# Patient Record
Sex: FEMALE | Race: BLACK OR AFRICAN AMERICAN | Employment: UNEMPLOYED | URBAN - METROPOLITAN AREA
[De-identification: names, ages, dates, MRNs, and addresses within clinical notes are randomized per-mention and may not be internally consistent; named-entity substitution may affect disease eponyms.]

---

## 2018-06-21 ENCOUNTER — CLINICAL DOCUMENTATION (OUTPATIENT)
Dept: ENT CLINIC | Age: 73
End: 2018-06-21

## 2018-06-25 ENCOUNTER — HOSPITAL ENCOUNTER (OUTPATIENT)
Dept: RADIATION ONCOLOGY | Age: 73
Discharge: HOME | End: 2018-06-25
Payer: COMMERCIAL

## 2018-06-25 VITALS
OXYGEN SATURATION: 93 % | HEIGHT: 66 IN | DIASTOLIC BLOOD PRESSURE: 64 MMHG | TEMPERATURE: 98.2 F | HEART RATE: 83 BPM | WEIGHT: 161.3 LBS | SYSTOLIC BLOOD PRESSURE: 110 MMHG | BODY MASS INDEX: 25.92 KG/M2

## 2018-06-25 DIAGNOSIS — C80.1 CANCER (HCC): Primary | ICD-10-CM

## 2018-06-25 PROCEDURE — 99205 OFFICE O/P NEW HI 60 MIN: CPT

## 2018-06-25 PROCEDURE — 99205 OFFICE O/P NEW HI 60 MIN: CPT | Performed by: RADIOLOGY

## 2018-06-29 ENCOUNTER — HOSPITAL ENCOUNTER (OUTPATIENT)
Dept: RADIATION ONCOLOGY | Age: 73
Discharge: HOME | End: 2018-06-29
Payer: COMMERCIAL

## 2018-06-29 ENCOUNTER — TELEPHONE (OUTPATIENT)
Dept: RADIATION ONCOLOGY | Age: 73
End: 2018-06-29

## 2018-06-29 PROCEDURE — 77334 RADIATION TREATMENT AID(S): CPT

## 2018-06-29 RX ORDER — MELOXICAM 7.5 MG/1
7.5 TABLET ORAL 2 TIMES DAILY
COMMUNITY
End: 2019-04-11

## 2018-06-29 RX ORDER — DEXAMETHASONE 4 MG/1
4 TABLET ORAL 2 TIMES DAILY WITH MEALS
COMMUNITY
End: 2019-04-11

## 2018-06-29 RX ORDER — MIRTAZAPINE 30 MG/1
30 TABLET, FILM COATED ORAL NIGHTLY
Status: ON HOLD | COMMUNITY
End: 2019-04-17 | Stop reason: HOSPADM

## 2018-06-29 RX ORDER — PROCHLORPERAZINE MALEATE 10 MG
10 TABLET ORAL EVERY 6 HOURS PRN
COMMUNITY
End: 2019-04-11

## 2018-06-29 RX ORDER — ONDANSETRON HYDROCHLORIDE 8 MG/1
8 TABLET, FILM COATED ORAL EVERY 8 HOURS PRN
COMMUNITY
End: 2019-04-11

## 2018-07-02 ENCOUNTER — HOSPITAL ENCOUNTER (OUTPATIENT)
Dept: RADIATION ONCOLOGY | Age: 73
Discharge: HOME | End: 2018-07-02
Payer: COMMERCIAL

## 2018-07-02 DIAGNOSIS — T66.XXXA RADIATION-INDUCED ESOPHAGITIS: Primary | ICD-10-CM

## 2018-07-02 DIAGNOSIS — R13.10 ODYNOPHAGIA: ICD-10-CM

## 2018-07-02 DIAGNOSIS — K20.80 RADIATION-INDUCED ESOPHAGITIS: Primary | ICD-10-CM

## 2018-07-03 ENCOUNTER — TELEPHONE (OUTPATIENT)
Dept: RADIATION ONCOLOGY | Age: 73
End: 2018-07-03

## 2018-07-03 NOTE — TELEPHONE ENCOUNTER
Completed and emailed transportation request to [employer] for pt's radiation therapy appts 7/6 through 8/15. Confirmation Numbers are below:     Dates         Confirmation #  Dates      Confirmation #   Dates    Confirmation #  Dates       Confirmation #    [id number] 22.66.6033 [id number] 07.31.2018 [id number] 79.31.2606 [id number]   43.43.9953 [id number] 81.65.5078 [id number] 40.77.2317 [id number] 38.91.6153 [id number]   07.10.2018 [id number] 07.20.2018 [id number] 60.67.8747 [id number]     07.11.2018 [id number] 07.23.2018 [id number] 64.17.6081 [id number]     07.12.2018 [id number] 59.54.2706 [id number] 51.96.6381 [id number]     07.13.2018 [id number] 07.26.2018 [id number] 14.53.9692 [id number]     07.16.2018 [id number] 07.27.2018 [id number] 08.10.2018 [id number]       Dates                Confirmation #  [id number]            [id number]  71.05.1768       [id number]  07.30.2018        [id number]  25.88.1650         [id number]  94.67.4183         [id number]  08.15.2018        [id number]    Called pt to update her. There was no answer. Stated that pt would have transportation with [employer] starting on 7/6 through the duration of treatment. Left a message encouraging to return call.

## 2018-07-05 PROCEDURE — 77301 RADIOTHERAPY DOSE PLAN IMRT: CPT

## 2018-07-05 PROCEDURE — 77386 HC NTSTY MODUL RAD TX DLVR CPLX: CPT

## 2018-07-05 PROCEDURE — 77338 DESIGN MLC DEVICE FOR IMRT: CPT

## 2018-07-05 PROCEDURE — 77300 RADIATION THERAPY DOSE PLAN: CPT

## 2018-07-06 ENCOUNTER — TELEPHONE (OUTPATIENT)
Dept: RADIATION ONCOLOGY | Age: 73
End: 2018-07-06

## 2018-07-06 PROCEDURE — 77386 HC NTSTY MODUL RAD TX DLVR CPLX: CPT

## 2018-07-09 PROCEDURE — 77386 HC NTSTY MODUL RAD TX DLVR CPLX: CPT

## 2018-07-10 PROCEDURE — 77386 HC NTSTY MODUL RAD TX DLVR CPLX: CPT

## 2018-07-11 ENCOUNTER — HOSPITAL ENCOUNTER (OUTPATIENT)
Dept: RADIATION ONCOLOGY | Age: 73
Discharge: HOME | End: 2018-07-11
Payer: COMMERCIAL

## 2018-07-11 VITALS
WEIGHT: 171.6 LBS | HEART RATE: 90 BPM | DIASTOLIC BLOOD PRESSURE: 76 MMHG | OXYGEN SATURATION: 94 % | SYSTOLIC BLOOD PRESSURE: 120 MMHG | BODY MASS INDEX: 28.12 KG/M2 | TEMPERATURE: 97.8 F

## 2018-07-11 DIAGNOSIS — C34.92 ADENOCARCINOMA, LUNG, LEFT (HCC): Primary | ICD-10-CM

## 2018-07-11 PROCEDURE — 99999 PR OFFICE/OUTPT VISIT,PROCEDURE ONLY: CPT | Performed by: RADIOLOGY

## 2018-07-11 PROCEDURE — 77336 RADIATION PHYSICS CONSULT: CPT

## 2018-07-11 PROCEDURE — 77386 HC NTSTY MODUL RAD TX DLVR CPLX: CPT

## 2018-07-11 NOTE — PROGRESS NOTES
DEPARTMENT OF RADIATION ONCOLOGY   ON TREATMENT VISIT       7/11/2018      NAME:  Shoshana Jason    YOB: 1945      Diagnosis: NSCLC left upper lung. SUBJECTIVE:   Shoshana Jason status post  1000 cGy to the lung mass with positive lymph nodes. She also gets weekly chemotherapy. She denies any symptoms of dysphagia, cough or chest pain. Physical Examination: Mild skin reaction anterior chest.  She looks comfortable      Wt Readings from Last 3 Encounters:   07/11/18 171 lb 9.6 oz (77.8 kg)   06/25/18 161 lb 4.8 oz (73.2 kg)   11/01/17 174 lb (78.9 kg)       Labs:No results found for: WBC, RBC, HGB, HCT, MCV, MCH, MCHC, RDW, PLT, MPV         ASSESSMENT/PLAN:     Continue treatment as planned      Cassidy Askew M.D.   Radiation Oncologist  [facility]: [phone]   Andrews Winston: [phone] Knapp: [phone]   Andrews Winston: [phone])

## 2018-07-12 PROCEDURE — 77386 HC NTSTY MODUL RAD TX DLVR CPLX: CPT

## 2018-07-13 PROCEDURE — 77386 HC NTSTY MODUL RAD TX DLVR CPLX: CPT

## 2018-07-16 ENCOUNTER — TELEPHONE (OUTPATIENT)
Dept: RADIATION ONCOLOGY | Age: 73
End: 2018-07-16

## 2018-07-16 ENCOUNTER — TELEPHONE (OUTPATIENT)
Dept: INFUSION THERAPY | Age: 73
End: 2018-07-16

## 2018-07-16 PROCEDURE — 77386 HC NTSTY MODUL RAD TX DLVR CPLX: CPT

## 2018-07-16 NOTE — TELEPHONE ENCOUNTER
Contacted patient per referral, re: weight loss. Patient recently started radiation for her Lung CA. She reports she did lose weight, but she has \"gained it all back and more\". Her appetite is \"great\" right now, and she is happy about this. She hopes it continues to be this way. She is also taking Boost Plus usually twice daily. She was encouraged to reach out to this clinician during her time in radiation if her appetite changes or she experiences weight loss. She was receptive.  Nav Villatoro RD,,LD

## 2018-07-17 PROCEDURE — 77386 HC NTSTY MODUL RAD TX DLVR CPLX: CPT

## 2018-07-18 ENCOUNTER — HOSPITAL ENCOUNTER (OUTPATIENT)
Dept: RADIATION ONCOLOGY | Age: 73
Discharge: HOME | End: 2018-07-18
Payer: COMMERCIAL

## 2018-07-18 VITALS
TEMPERATURE: 98 F | DIASTOLIC BLOOD PRESSURE: 64 MMHG | WEIGHT: 170.5 LBS | OXYGEN SATURATION: 98 % | BODY MASS INDEX: 27.94 KG/M2 | HEART RATE: 92 BPM | SYSTOLIC BLOOD PRESSURE: 106 MMHG

## 2018-07-18 DIAGNOSIS — C80.1 CANCER (HCC): Primary | ICD-10-CM

## 2018-07-18 PROCEDURE — 77336 RADIATION PHYSICS CONSULT: CPT

## 2018-07-18 PROCEDURE — 77386 HC NTSTY MODUL RAD TX DLVR CPLX: CPT

## 2018-07-18 PROCEDURE — 99999 PR OFFICE/OUTPT VISIT,PROCEDURE ONLY: CPT | Performed by: RADIOLOGY

## 2018-07-18 NOTE — PROGRESS NOTES
DEPARTMENT OF RADIATION ONCOLOGY ON TREATMENT VISIT         7/18/2018      NAME:  Leonidas Garcia    YOB: 1945      Diagnosis: lung cancer      SUBJECTIVE:   Leonidas Garcia has now received 2000 cGy in 10/30 fractions directed to the lung. Past medical, surgical, social and family histories reviewed and updated as indicated. Pain: controlled      ALLERGIES:  Patient has no known allergies. Current Outpatient Prescriptions   Medication Sig Dispense Refill    mirtazapine (REMERON) 30 MG tablet Take 30 mg by mouth nightly      dexamethasone (DECADRON) 4 MG tablet Take 4 mg by mouth 2 times daily (with meals) 5pills by mouth12 hours before and 6 hours prior to chemo infusion      meloxicam (MOBIC) 7.5 MG tablet Take 7.5 mg by mouth 2 times daily      prochlorperazine (COMPAZINE) 10 MG tablet Take 10 mg by mouth every 6 hours as needed      ondansetron (ZOFRAN) 8 MG tablet Take 8 mg by mouth every 8 hours as needed for Nausea or Vomiting      Probiotic Product (PROBIOTIC DAILY PO) Take by mouth as needed      NONFORMULARY Patient states use a breathing treatment prn for chronic bronchitis / rarely uses  Instructed to use dos if needs      triamterene-hydrochlorothiazide (DYAZIDE) 50-25 MG per capsule Take 1 capsule by mouth every morning Patient takes 37.5mg-25mg      HYDROcodone-acetaminophen (NORCO) 5-325 MG per tablet Take 1 tablet by mouth every 6 hours as needed for Pain  Instructed to take morning of surgery with a sip of water if needs. No current facility-administered medications for this encounter. OBJECTIVE:  Alert and fully ambulatory. Pleasant and conversant. Physical Examination: General appearance - alert, well appearing, and in no distress.           Wt Readings from Last 3 Encounters:   07/18/18 170 lb 8 oz (77.3 kg)   07/11/18 171 lb 9.6 oz (77.8 kg)   06/25/18 161 lb 4.8 oz (73.2 kg)         ASSESSMENT/PLAN:     Patient is tolerating treatments well with expected toxicities. RBA were reviewed prior to first fraction and PRN. Current and planned dose reviewed. Goals of treatment and potential side effects were reviewed with the patient PRN. Treatment imaging has been personally reviewed for accuracy and precision. Questions answered to apparent satisfaction. Treatments will continue as planned. Kyleigh Topete.  Angela Chavez MD MS DABR  Radiation Oncologist        [facility] ([address] [place] [address]): [phone] /// FAX: [phone]  [facility]): [phone] /// FAX: [phone]  [address] [place] [address]): [phone] /// FAX: [phone]

## 2018-07-18 NOTE — PROGRESS NOTES
Adalid Moran  7/18/2018  Wt Readings from Last 3 Encounters:   07/18/18 170 lb 8 oz (77.3 kg)   07/11/18 171 lb 9.6 oz (77.8 kg)   06/25/18 161 lb 4.8 oz (73.2 kg)     Body mass index is 27.94 kg/m². Treatment Area:lung    Patient was seen today for weekly visit. Comfort Alteration  KPS:80%  Fatigue: Moderate    Ventilation Alterations  Cough: Yes  Hemoptysis: No  Mucus Color: white watery   Dyspnea: No  O2 Sat: 98%    Nutritional Alteration  Anorexia: No  Nausea: No   Vomiting: No     Skin Alteration   Sensation:na    Radiation Dermatitis:  na    Mucous Membrane Alteration  Voice Changes/ Stridor/Larynx: no  Pharynx & Esophagus: na    Elimination Alterations  Constipation: no  Diarrhea:  no      Emotional  Coping: effective      Injury, potential bleeding or infection: na    Other:Patient experiencing some acid reflux issues at this time-was constipated but this issue has since resolved with daily stool softener usage. No results found for: WBC, PLT      /64   Pulse 92   Temp 98 °F (36.7 °C) (Oral)   Wt 170 lb 8 oz (77.3 kg)   SpO2 98%   BMI 27.94 kg/m²   BP within normal range? yes          Assessment/Plan: Patient has received 10/30 fractions, 2000/6000cGy-Currently states she is tired-\"wore out\" from receiving both chemo and radiation therapy yesterday. Patient has no other issues or concerns at this time.      Bhavana Tracey

## 2018-07-18 NOTE — PATIENT INSTRUCTIONS
Continue daily fractionated radiation therapy as scheduled. Please see weekly OTV note and intial consultation letter in [facility] for clinical details. Clarisse Matteo.  Billy Pereira MD [address] [place] [address]  Radiation Oncology  [place]:  [phone]   FAX:    [phone]: [address] [place] [address] Road:  [phone]

## 2018-07-19 PROCEDURE — 77386 HC NTSTY MODUL RAD TX DLVR CPLX: CPT

## 2018-07-20 PROCEDURE — 77386 HC NTSTY MODUL RAD TX DLVR CPLX: CPT

## 2018-07-23 ENCOUNTER — TELEPHONE (OUTPATIENT)
Dept: ONCOLOGY | Age: 73
End: 2018-07-23

## 2018-07-23 PROCEDURE — 77386 HC NTSTY MODUL RAD TX DLVR CPLX: CPT

## 2018-07-23 RX ORDER — SUCRALFATE 1 G/1
1 TABLET ORAL 4 TIMES DAILY
Qty: 120 TABLET | Refills: 2 | Status: SHIPPED | OUTPATIENT
Start: 2018-07-23 | End: 2018-10-30 | Stop reason: SDUPTHER

## 2018-07-23 NOTE — TELEPHONE ENCOUNTER
Met with patient following her daily radiation therapy treatment for follow up. Patient has had 13 treatments. Upon inquiring, states that she is doing ok with the treatments. Reports sore throat/burning issues. Sasha Cole NP after treatment today and was given scripts for carafate and mmw. States that transportation is going good. Provided support and encouragement. No other needs at this time. Patient appreciative of visit. Will continue to follow.

## 2018-07-23 NOTE — PROGRESS NOTES
DEPARTMENT OF RADIATION ONCOLOGY   ON TREATMENT VISIT       7/23/2018      NAME:  Charles Current    YOB: 1945    Diagnosis: Locally advanced (III-C) LEMUEL adenocarcinoma. -PDL 1= 40%    SUBJECTIVE:   Charles Current has now received 2,600 cGy in 13/30 fractions directed to the LEMUEL and mediastinum. Seen today due to complaints of odynphagia along with complaints of esophageal reflux. She reports she is eating small amounts of soft foods and is drinking nutritional supplemental shakes (Ensure). Patient follows with Dr. Eliot Holder for medical oncology. Past medical, surgical, social and family histories reviewed and updated as indicated. Pain: painful swallow. ALLERGIES:  Patient has no known allergies. Current Outpatient Prescriptions   Medication Sig Dispense Refill    Magic Mouthwash (MIRACLE MOUTHWASH) Swish and swallow 10 mLs 4 times daily as needed for Irritation Shake well before using.  240 mL 2    sucralfate (CARAFATE) 1 GM tablet Take 1 tablet by mouth 4 times daily 120 tablet 2    mirtazapine (REMERON) 30 MG tablet Take 30 mg by mouth nightly      dexamethasone (DECADRON) 4 MG tablet Take 4 mg by mouth 2 times daily (with meals) 5pills by mouth12 hours before and 6 hours prior to chemo infusion      meloxicam (MOBIC) 7.5 MG tablet Take 7.5 mg by mouth 2 times daily      prochlorperazine (COMPAZINE) 10 MG tablet Take 10 mg by mouth every 6 hours as needed      ondansetron (ZOFRAN) 8 MG tablet Take 8 mg by mouth every 8 hours as needed for Nausea or Vomiting      Probiotic Product (PROBIOTIC DAILY PO) Take by mouth as needed      NONFORMULARY Patient states use a breathing treatment prn for chronic bronchitis / rarely uses  Instructed to use dos if needs      triamterene-hydrochlorothiazide (DYAZIDE) 50-25 MG per capsule Take 1 capsule by mouth every morning Patient takes 37.5mg-25mg      HYDROcodone-acetaminophen (NORCO) 5-325 MG per tablet Take 1 tablet by mouth every 6 hours as needed for Pain  Instructed to take morning of surgery with a sip of water if needs. No current facility-administered medications for this encounter. OBJECTIVE:     Wt Readings from Last 3 Encounters:   07/18/18 170 lb 8 oz (77.3 kg)   07/11/18 171 lb 9.6 oz (77.8 kg)   06/25/18 161 lb 4.8 oz (73.2 kg)       Alert and fully ambulatory. Pleasant and conversant. Irradiated skin intact no changes. ASSESSMENT/PLAN:     Patient is tolerating treatments with expected toxicities. Radiation induced esophagitis: Start Carafate. Odynophagia: Start MM. Will update Registered Dietitian Jeri Kline to follow-up with patient provide further dietary recommendations. Current and planned dose reviewed. Goals of treatment and potential side effects were reviewed with the patient. Questions answered to apparent satisfaction. Treatments will continue as planned.       Judie Ryan, MSN, RN, APRN-CNP  Certified Nurse Practitioner for [facility]        P: [phone]/ F: [phone]

## 2018-07-24 PROCEDURE — 77386 HC NTSTY MODUL RAD TX DLVR CPLX: CPT

## 2018-07-25 ENCOUNTER — HOSPITAL ENCOUNTER (OUTPATIENT)
Dept: RADIATION ONCOLOGY | Age: 73
Discharge: HOME | End: 2018-07-25
Payer: COMMERCIAL

## 2018-07-25 VITALS
DIASTOLIC BLOOD PRESSURE: 76 MMHG | OXYGEN SATURATION: 97 % | SYSTOLIC BLOOD PRESSURE: 122 MMHG | TEMPERATURE: 98 F | HEART RATE: 95 BPM

## 2018-07-25 DIAGNOSIS — C80.1 CANCER (HCC): Primary | ICD-10-CM

## 2018-07-25 PROCEDURE — 77386 HC NTSTY MODUL RAD TX DLVR CPLX: CPT

## 2018-07-25 PROCEDURE — 77336 RADIATION PHYSICS CONSULT: CPT

## 2018-07-25 PROCEDURE — 99999 PR OFFICE/OUTPT VISIT,PROCEDURE ONLY: CPT | Performed by: RADIOLOGY

## 2018-07-25 NOTE — PROGRESS NOTES
well appearing, and in no distress. Wt Readings from Last 3 Encounters:   07/18/18 170 lb 8 oz (77.3 kg)   07/11/18 171 lb 9.6 oz (77.8 kg)   06/25/18 161 lb 4.8 oz (73.2 kg)         ASSESSMENT/PLAN:     Patient is tolerating treatments well with expected toxicities. RBA were reviewed prior to first fraction and PRN. Current and planned dose reviewed. Goals of treatment and potential side effects were reviewed with the patient PRN. Treatment imaging has been personally reviewed for accuracy and precision. Questions answered to apparent satisfaction. Treatments will continue as planned.      -carafate (cont)        Flores Coon.  Lenny De La Rosa MD MS DABR  Radiation Oncologist        [facility] ([address] [facility]): [phone] /// FAX: [phone]  [facility]): [phone] /// FAX: [phone]  [address] [place] [address]): [phone] /// FAX: [phone]

## 2018-07-26 ENCOUNTER — TELEPHONE (OUTPATIENT)
Dept: RADIATION ONCOLOGY | Age: 73
End: 2018-07-26

## 2018-07-26 PROCEDURE — 77386 HC NTSTY MODUL RAD TX DLVR CPLX: CPT

## 2018-07-26 NOTE — PROGRESS NOTES
Pt tolerated exercise. See scanned report.   foods, tomato sauce and citrus; Add other soft foods and modify foods to soften for tolerance. Will monitor for need for increase in Boost Plus. [employer] GUIDELINES FOR CLINICAL CHARACTERISTICS OF MALNUTRITION IN CHRONIC ILLNESS     Moderate Malnutrition  Severe Malnutrition    Energy intake  <75% energy intake compared to estimated needs for >1month <75% energy intake compared to estimated needs for >1month   Weight changes  5% x 1 month  7.5% x 3 months   10% x 6 months   20% x 1 year  >5% x 1 month  >7.5% x 3 months   >10% x 6 months   >20% x 1 year    Physical findings  Mild   Decrease subcutaneous fat    Decrease muscle mass     Increase fluid/edema   Severe  Decrease subcutaneous fat    Decrease muscle mass     Increase fluid/edema    At risk for malnutrition due to side effects from treatment and reported <75% intake over the past 3-5 days.     Delio Schreiber

## 2018-07-27 PROCEDURE — 77386 HC NTSTY MODUL RAD TX DLVR CPLX: CPT

## 2018-07-30 PROCEDURE — 77386 HC NTSTY MODUL RAD TX DLVR CPLX: CPT

## 2018-07-31 PROCEDURE — 77386 HC NTSTY MODUL RAD TX DLVR CPLX: CPT

## 2018-08-01 ENCOUNTER — HOSPITAL ENCOUNTER (OUTPATIENT)
Dept: RADIATION ONCOLOGY | Age: 73
Discharge: HOME | End: 2018-08-01
Payer: COMMERCIAL

## 2018-08-01 VITALS
HEART RATE: 90 BPM | TEMPERATURE: 97.9 F | WEIGHT: 168.5 LBS | BODY MASS INDEX: 27.61 KG/M2 | SYSTOLIC BLOOD PRESSURE: 102 MMHG | DIASTOLIC BLOOD PRESSURE: 60 MMHG | OXYGEN SATURATION: 95 %

## 2018-08-01 DIAGNOSIS — C80.1 CANCER (HCC): Primary | ICD-10-CM

## 2018-08-01 PROCEDURE — 99999 PR OFFICE/OUTPT VISIT,PROCEDURE ONLY: CPT | Performed by: RADIOLOGY

## 2018-08-01 PROCEDURE — 77336 RADIATION PHYSICS CONSULT: CPT

## 2018-08-01 PROCEDURE — 77386 HC NTSTY MODUL RAD TX DLVR CPLX: CPT

## 2018-08-01 NOTE — PATIENT INSTRUCTIONS
Continue daily fractionated radiation therapy as scheduled. Please see weekly OTV note and intial consultation letter in [facility] for clinical details. Saige Solis.  Yakov Ashley MD [address] [place] [address]  Radiation Oncology  [facility]:  [phone]   FAX:    [phone] [facility]: [facility]:  [phone]

## 2018-08-01 NOTE — PROGRESS NOTES
well appearing, and in no distress. Wt Readings from Last 3 Encounters:   08/01/18 168 lb 8 oz (76.4 kg)   07/18/18 170 lb 8 oz (77.3 kg)   07/11/18 171 lb 9.6 oz (77.8 kg)         ASSESSMENT/PLAN:     Patient is tolerating treatments well with expected toxicities. RBA were reviewed prior to first fraction and PRN. Current and planned dose reviewed. Goals of treatment and potential side effects were reviewed with the patient PRN. Treatment imaging has been personally reviewed for accuracy and precision. Questions answered to apparent satisfaction. Treatments will continue as planned. Maegan Goodson.  Keisha Faith MD MS DABR  Radiation Oncologist        [facility] ([address] [place] [address]): [phone] /// FAX: [phone]  [facility]): [phone] /// FAX: [phone]  [address] [place] [address]): [phone] /// FAX: [phone]

## 2018-08-02 PROCEDURE — 77386 HC NTSTY MODUL RAD TX DLVR CPLX: CPT

## 2018-08-02 NOTE — PROGRESS NOTES
DEPARTMENT OF RADIATION ONCOLOGY   ON TREATMENT VISIT       8/2/2018      NAME:  Basilia Borges    YOB: 1945    Diagnosis: Locally advanced (III-C) LEMUEL adenocarcinoma. SUBJECTIVE:   Basilia Borges has now received 4200 cGy in 21/30 fractions directed to the LEMUEL and mdstnm. Seen after receiving today XRT. Patient reports she is doing okay. Complains of odynophagia and ongoing esophagitis. Denies mouth sores or xerostomia. States \"its becoming painful to drink water\". Just recently pick-up prescription MM. Reports decreased pain with MM. Patient reports compliance with sucralfate slurry 4 x day. She reports less esophageal refluxing on medication. She is drinking Boost nutritional supplement 1 can per day. Patient reports increase difficulty with solid food intake. Patient denies fevers, chills, nausea/ vomiting or diarrhea. Patient follows with Dr. Nasrin Javier for medical oncology. Past medical, surgical, social and family histories reviewed and updated as indicated. Pain: + Sore throat. (See above.)     ALLERGIES:  Patient has no known allergies. Current Outpatient Prescriptions   Medication Sig Dispense Refill    Magic Mouthwash (MIRACLE MOUTHWASH) Swish and swallow 10 mLs 4 times daily as needed for Irritation Shake well before using.  240 mL 2    sucralfate (CARAFATE) 1 GM tablet Take 1 tablet by mouth 4 times daily 120 tablet 2    mirtazapine (REMERON) 30 MG tablet Take 30 mg by mouth nightly      dexamethasone (DECADRON) 4 MG tablet Take 4 mg by mouth 2 times daily (with meals) 5pills by mouth12 hours before and 6 hours prior to chemo infusion      meloxicam (MOBIC) 7.5 MG tablet Take 7.5 mg by mouth 2 times daily      prochlorperazine (COMPAZINE) 10 MG tablet Take 10 mg by mouth every 6 hours as needed      ondansetron (ZOFRAN) 8 MG tablet Take 8 mg by mouth every 8 hours as needed for Nausea or Vomiting      Probiotic Product (PROBIOTIC DAILY PO) Take by mouth

## 2018-08-03 ENCOUNTER — TELEPHONE (OUTPATIENT)
Dept: ONCOLOGY | Age: 73
End: 2018-08-03

## 2018-08-03 PROCEDURE — 77386 HC NTSTY MODUL RAD TX DLVR CPLX: CPT

## 2018-08-06 PROCEDURE — 77386 HC NTSTY MODUL RAD TX DLVR CPLX: CPT

## 2018-08-07 PROCEDURE — 77386 HC NTSTY MODUL RAD TX DLVR CPLX: CPT

## 2018-08-08 ENCOUNTER — HOSPITAL ENCOUNTER (OUTPATIENT)
Dept: RADIATION ONCOLOGY | Age: 73
Discharge: HOME | End: 2018-08-08
Payer: COMMERCIAL

## 2018-08-08 VITALS
DIASTOLIC BLOOD PRESSURE: 68 MMHG | OXYGEN SATURATION: 96 % | BODY MASS INDEX: 25.76 KG/M2 | WEIGHT: 157.2 LBS | HEART RATE: 91 BPM | SYSTOLIC BLOOD PRESSURE: 112 MMHG

## 2018-08-08 DIAGNOSIS — C80.1 CANCER (HCC): Primary | ICD-10-CM

## 2018-08-08 PROCEDURE — 99999 PR OFFICE/OUTPT VISIT,PROCEDURE ONLY: CPT | Performed by: RADIOLOGY

## 2018-08-08 PROCEDURE — 77386 HC NTSTY MODUL RAD TX DLVR CPLX: CPT

## 2018-08-08 PROCEDURE — 77336 RADIATION PHYSICS CONSULT: CPT

## 2018-08-08 NOTE — PROGRESS NOTES
Examination: General appearance - alert, well appearing, and in no distress. Wt Readings from Last 3 Encounters:   08/08/18 157 lb 3.2 oz (71.3 kg)   08/01/18 168 lb 8 oz (76.4 kg)   07/18/18 170 lb 8 oz (77.3 kg)         ASSESSMENT/PLAN:     Patient is tolerating treatments well with expected toxicities. RBA were reviewed prior to first fraction and PRN. Current and planned dose reviewed. Goals of treatment and potential side effects were reviewed with the patient PRN. Treatment imaging has been personally reviewed for accuracy and precision. Questions answered to apparent satisfaction. Treatments will continue as planned.      -cont carafate  -cont MM  -narcotic per Dr. Kym Patel will bring this in brea to add to her chart. Trae Cates.  Janeth Sotelo MD MS ULISESR  Radiation Oncologist        [facility] ([address] [place] [address]): [phone] /// FAX: [phone]  [facility]): [phone] /// FAX: [phone]  [address] [place] [address]): [phone] /// FAX: [phone]

## 2018-08-09 PROCEDURE — 77386 HC NTSTY MODUL RAD TX DLVR CPLX: CPT

## 2018-08-09 RX ORDER — PANTOPRAZOLE SODIUM 40 MG/1
40 TABLET, DELAYED RELEASE ORAL DAILY
Refills: 3 | COMMUNITY
Start: 2018-07-31 | End: 2019-04-11

## 2018-08-09 RX ORDER — OXYCODONE HYDROCHLORIDE 5 MG/1
5 TABLET ORAL
COMMUNITY
Start: 2018-08-07 | End: 2019-04-11

## 2018-08-10 PROCEDURE — 77386 HC NTSTY MODUL RAD TX DLVR CPLX: CPT

## 2018-08-13 ENCOUNTER — HOSPITAL ENCOUNTER (OUTPATIENT)
Dept: INFUSION THERAPY | Age: 73
Discharge: HOME | End: 2018-08-13
Payer: COMMERCIAL

## 2018-08-13 VITALS
OXYGEN SATURATION: 97 % | WEIGHT: 155.7 LBS | SYSTOLIC BLOOD PRESSURE: 102 MMHG | HEIGHT: 66 IN | HEART RATE: 74 BPM | BODY MASS INDEX: 25.02 KG/M2 | DIASTOLIC BLOOD PRESSURE: 80 MMHG

## 2018-08-13 VITALS — DIASTOLIC BLOOD PRESSURE: 75 MMHG | TEMPERATURE: 99.1 F | SYSTOLIC BLOOD PRESSURE: 130 MMHG | HEART RATE: 97 BPM

## 2018-08-13 PROCEDURE — 96360 HYDRATION IV INFUSION INIT: CPT

## 2018-08-13 PROCEDURE — 77386 HC NTSTY MODUL RAD TX DLVR CPLX: CPT

## 2018-08-13 PROCEDURE — 2580000003 HC RX 258: Performed by: NURSE PRACTITIONER

## 2018-08-13 RX ORDER — SODIUM CHLORIDE 9 MG/ML
INJECTION, SOLUTION INTRAVENOUS ONCE
Status: COMPLETED | OUTPATIENT
Start: 2018-08-13 | End: 2018-08-13

## 2018-08-13 RX ADMIN — SODIUM CHLORIDE: 9 INJECTION, SOLUTION INTRAVENOUS at 14:53

## 2018-08-13 NOTE — PROGRESS NOTES
Patient not feeling well today, stating that she has been vomiting the entire weekend-patient initially offered IV hydration last Friday, Aug 10 by Dr Krystal Chau to which patient refused stating, she was visiting family in [facility] over the weekend. Vitals are as follows: SaO2 97% on room air, /80, HR 74. Patient short of breath with any type of exertion-patient wheeled to [facility] and registered for administration of 1L fluids today per NP Tello Braxton will follow with Med-Oncology tomorrow related to chemotherapy administration prior to arriving for radiation treatment.

## 2018-08-14 PROCEDURE — 77386 HC NTSTY MODUL RAD TX DLVR CPLX: CPT

## 2018-08-14 PROCEDURE — 77412 RADIATION TX DELIVERY LVL 3: CPT

## 2018-08-14 NOTE — PROGRESS NOTES
[employer]  8/14/2018  Wt Readings from Last 10 Encounters:   08/08/18 157 lb 3.2 oz (71.3 kg)   08/01/18 168 lb 8 oz (76.4 kg)   08/13/18 155 lb 11.2 oz (70.6 kg)   07/18/18 170 lb 8 oz (77.3 kg)   07/11/18 171 lb 9.6 oz (77.8 kg)   06/25/18 161 lb 4.8 oz (73.2 kg)   11/01/17 174 lb (78.9 kg)   10/18/17 170 lb (77.1 kg)   08/03/17 180 lb (81.6 kg)   07/24/17 175 lb (79.4 kg)     Ht Readings from Last 1 Encounters:   08/13/18 5' 5.5\" (1.664 m)     Body mass index is 25.52 kg/m². Met with patient today for follow up, she completes her radiation tomorrow. She had chemotherapy today. She is feeling fair. She continues to struggle with eating, only able to do soft, smooth foods, and beverages. She is drinking the Boost, but the vanilla now makes her sick to even smell. She is able to eat ice cream, mashed potatoes, cereal and puddings. She is going to go to the grocery store to try to get some soups and other foods. Suggested patient beware of hot foods, due to the smell associated with them. Encouraged cold foods such as puddings (rice, tapioca, isis), applesauce, cold cereals, milkshakes and smoothies. Encouraged her to try blending beverages with some foods to improve flavor and taste. Suggested blending her boost with a scoop of ice cream and a pouch of carnation instant breakfast for extra calories and protein. She is only drinking two boost per day. She lost 13# in 3 weeks. She did get IV fluids yesterday and is getting them tomorrow as well. Spoke with patient about the process of recovery post treatment, and encouraged her to take the blended shake twice daily each day. Recommended trying Ensure Butter Pecan as well. She was encouraged to frequently eat soft foods and continue liquids. If she gets to be unable to eat or drink, she is advised to call the facility or go to ED. She was receptive.      Weight change: 13# loss in 3 weeks, 23# loss in 1 year  Appetite: fair  N/V/D/C: some nausea  Calculated

## 2018-08-15 ENCOUNTER — HOSPITAL ENCOUNTER (OUTPATIENT)
Dept: INFUSION THERAPY | Age: 73
Discharge: HOME | End: 2018-08-15
Payer: COMMERCIAL

## 2018-08-15 ENCOUNTER — HOSPITAL ENCOUNTER (OUTPATIENT)
Dept: RADIATION ONCOLOGY | Age: 73
Discharge: HOME | End: 2018-08-15
Payer: COMMERCIAL

## 2018-08-15 ENCOUNTER — TELEPHONE (OUTPATIENT)
Dept: RADIATION ONCOLOGY | Age: 73
End: 2018-08-15

## 2018-08-15 VITALS
SYSTOLIC BLOOD PRESSURE: 140 MMHG | HEART RATE: 76 BPM | TEMPERATURE: 98.1 F | RESPIRATION RATE: 20 BRPM | DIASTOLIC BLOOD PRESSURE: 76 MMHG

## 2018-08-15 DIAGNOSIS — C80.1 CANCER (HCC): Primary | ICD-10-CM

## 2018-08-15 DIAGNOSIS — C80.1 CANCER (HCC): ICD-10-CM

## 2018-08-15 PROCEDURE — 2580000003 HC RX 258

## 2018-08-15 PROCEDURE — 77336 RADIATION PHYSICS CONSULT: CPT

## 2018-08-15 PROCEDURE — 2580000003 HC RX 258: Performed by: RADIOLOGY

## 2018-08-15 PROCEDURE — 99999 PR OFFICE/OUTPT VISIT,PROCEDURE ONLY: CPT | Performed by: RADIOLOGY

## 2018-08-15 PROCEDURE — 77386 HC NTSTY MODUL RAD TX DLVR CPLX: CPT

## 2018-08-15 PROCEDURE — 6360000002 HC RX W HCPCS: Performed by: RADIOLOGY

## 2018-08-15 PROCEDURE — 96360 HYDRATION IV INFUSION INIT: CPT

## 2018-08-15 RX ORDER — 0.9 % SODIUM CHLORIDE 0.9 %
1000 INTRAVENOUS SOLUTION INTRAVENOUS ONCE
Status: COMPLETED | OUTPATIENT
Start: 2018-08-15 | End: 2018-08-15

## 2018-08-15 RX ORDER — SODIUM CHLORIDE 9 MG/ML
INJECTION, SOLUTION INTRAVENOUS
Status: COMPLETED
Start: 2018-08-15 | End: 2018-08-15

## 2018-08-15 RX ORDER — SODIUM CHLORIDE 0.9 % (FLUSH) 0.9 %
10 SYRINGE (ML) INJECTION PRN
Status: DISCONTINUED | OUTPATIENT
Start: 2018-08-15 | End: 2018-08-16 | Stop reason: HOSPADM

## 2018-08-15 RX ORDER — 0.9 % SODIUM CHLORIDE 0.9 %
1000 INTRAVENOUS SOLUTION INTRAVENOUS ONCE
Status: CANCELLED | OUTPATIENT
Start: 2018-08-15 | End: 2018-08-15

## 2018-08-15 RX ORDER — HEPARIN SODIUM (PORCINE) LOCK FLUSH IV SOLN 100 UNIT/ML 100 UNIT/ML
500 SOLUTION INTRAVENOUS PRN
Status: DISCONTINUED | OUTPATIENT
Start: 2018-08-15 | End: 2018-08-16 | Stop reason: HOSPADM

## 2018-08-15 RX ORDER — SODIUM CHLORIDE 0.9 % (FLUSH) 0.9 %
10 SYRINGE (ML) INJECTION PRN
Status: CANCELLED | OUTPATIENT
Start: 2018-08-15

## 2018-08-15 RX ORDER — HEPARIN SODIUM (PORCINE) LOCK FLUSH IV SOLN 100 UNIT/ML 100 UNIT/ML
500 SOLUTION INTRAVENOUS PRN
Status: CANCELLED | OUTPATIENT
Start: 2018-08-15

## 2018-08-15 RX ADMIN — HEPARIN 500 UNITS: 100 SYRINGE at 14:49

## 2018-08-15 RX ADMIN — Medication 1000 ML: at 13:44

## 2018-08-15 RX ADMIN — Medication 10 ML: at 14:49

## 2018-08-15 RX ADMIN — Medication 10 ML: at 13:42

## 2018-08-15 RX ADMIN — SODIUM CHLORIDE 1000 ML: 9 INJECTION, SOLUTION INTRAVENOUS at 13:44

## 2018-08-15 NOTE — PROGRESS NOTES
Adolfo Mandeep  8/15/2018  7:53 AM          Current Outpatient Prescriptions   Medication Sig Dispense Refill    oxyCODONE (ROXICODONE) 5 MG immediate release tablet Take 5 mg by mouth every 3 hours as needed. Rosannepiotr Mutgia pantoprazole (PROTONIX) 40 MG tablet Take 40 mg by mouth daily  3    Magic Mouthwash (MIRACLE MOUTHWASH) Swish and swallow 10 mLs 4 times daily as needed for Irritation Shake well before using. 240 mL 2    sucralfate (CARAFATE) 1 GM tablet Take 1 tablet by mouth 4 times daily 120 tablet 2    mirtazapine (REMERON) 30 MG tablet Take 30 mg by mouth nightly      dexamethasone (DECADRON) 4 MG tablet Take 4 mg by mouth 2 times daily (with meals) 5pills by mouth12 hours before and 6 hours prior to chemo infusion      meloxicam (MOBIC) 7.5 MG tablet Take 7.5 mg by mouth 2 times daily      prochlorperazine (COMPAZINE) 10 MG tablet Take 10 mg by mouth every 6 hours as needed      ondansetron (ZOFRAN) 8 MG tablet Take 8 mg by mouth every 8 hours as needed for Nausea or Vomiting      Probiotic Product (PROBIOTIC DAILY PO) Take by mouth as needed      NONFORMULARY Patient states use a breathing treatment prn for chronic bronchitis / rarely uses  Instructed to use dos if needs      triamterene-hydrochlorothiazide (DYAZIDE) 50-25 MG per capsule Take 1 capsule by mouth every morning Patient takes 37.5mg-25mg      HYDROcodone-acetaminophen (NORCO) 5-325 MG per tablet Take 1 tablet by mouth every 6 hours as needed for Pain  Instructed to take morning of surgery with a sip of water if needs. No current facility-administered medications for this encounter. This is an up-to-date medication list.    Please take this list to your next care provider, and discard any previous medication lists.

## 2018-08-15 NOTE — TELEPHONE ENCOUNTER
Met with patient following her daily radiation therapy treatment for follow up for radiation therapy completion. Patient had final treatment today. Upon inquiring, states that she is doing ok with the treatments. She states she is hungry. She had IV hydration today prior to treatment. She states it does help but she just wants to get home to eat. Provided support and encouragement. She was given a 6 week follow up appointment with Marquez Kennedy NP for Dr Krystal Chau after completion. Declines any current needs for assistance. Instructed to call with any needs or concerns. Patient appreciative of visit. Will continue to follow.

## 2018-08-21 ENCOUNTER — TELEPHONE (OUTPATIENT)
Dept: RADIATION ONCOLOGY | Age: 73
End: 2018-08-21

## 2018-08-22 ENCOUNTER — HOSPITAL ENCOUNTER (OUTPATIENT)
Dept: INFUSION THERAPY | Age: 73
Discharge: HOME | End: 2018-08-22
Payer: COMMERCIAL

## 2018-08-22 ENCOUNTER — TELEPHONE (OUTPATIENT)
Dept: RADIATION ONCOLOGY | Age: 73
End: 2018-08-22

## 2018-08-22 VITALS
SYSTOLIC BLOOD PRESSURE: 102 MMHG | HEART RATE: 123 BPM | RESPIRATION RATE: 18 BRPM | TEMPERATURE: 99.8 F | DIASTOLIC BLOOD PRESSURE: 72 MMHG

## 2018-08-22 DIAGNOSIS — C80.1 CANCER (HCC): ICD-10-CM

## 2018-08-22 PROCEDURE — 6360000002 HC RX W HCPCS: Performed by: NURSE PRACTITIONER

## 2018-08-22 PROCEDURE — 96360 HYDRATION IV INFUSION INIT: CPT

## 2018-08-22 PROCEDURE — 2580000003 HC RX 258: Performed by: NURSE PRACTITIONER

## 2018-08-22 RX ORDER — SODIUM CHLORIDE 0.9 % (FLUSH) 0.9 %
10 SYRINGE (ML) INJECTION PRN
Status: CANCELLED | OUTPATIENT
Start: 2018-08-22

## 2018-08-22 RX ORDER — 0.9 % SODIUM CHLORIDE 0.9 %
1000 INTRAVENOUS SOLUTION INTRAVENOUS ONCE
Status: CANCELLED | OUTPATIENT
Start: 2018-08-22 | End: 2018-08-22

## 2018-08-22 RX ORDER — SODIUM CHLORIDE 0.9 % (FLUSH) 0.9 %
10 SYRINGE (ML) INJECTION PRN
Status: DISCONTINUED | OUTPATIENT
Start: 2018-08-22 | End: 2018-08-23 | Stop reason: HOSPADM

## 2018-08-22 RX ORDER — HEPARIN SODIUM (PORCINE) LOCK FLUSH IV SOLN 100 UNIT/ML 100 UNIT/ML
500 SOLUTION INTRAVENOUS PRN
Status: DISCONTINUED | OUTPATIENT
Start: 2018-08-22 | End: 2018-08-23 | Stop reason: HOSPADM

## 2018-08-22 RX ORDER — HEPARIN SODIUM (PORCINE) LOCK FLUSH IV SOLN 100 UNIT/ML 100 UNIT/ML
500 SOLUTION INTRAVENOUS PRN
Status: CANCELLED | OUTPATIENT
Start: 2018-08-22

## 2018-08-22 RX ORDER — 0.9 % SODIUM CHLORIDE 0.9 %
1000 INTRAVENOUS SOLUTION INTRAVENOUS ONCE
Status: COMPLETED | OUTPATIENT
Start: 2018-08-22 | End: 2018-08-22

## 2018-08-22 RX ADMIN — HEPARIN 500 UNITS: 100 SYRINGE at 15:02

## 2018-08-22 RX ADMIN — Medication 10 ML: at 13:55

## 2018-08-22 RX ADMIN — Medication 10 ML: at 15:02

## 2018-08-22 RX ADMIN — Medication 10 ML: at 13:54

## 2018-08-22 RX ADMIN — SODIUM CHLORIDE 1000 ML: 9 INJECTION, SOLUTION INTRAVENOUS at 13:55

## 2018-08-23 ENCOUNTER — TELEPHONE (OUTPATIENT)
Dept: INFUSION THERAPY | Age: 73
End: 2018-08-23

## 2018-08-24 ENCOUNTER — HOSPITAL ENCOUNTER (OUTPATIENT)
Dept: INFUSION THERAPY | Age: 73
Discharge: HOME | End: 2018-08-24
Payer: COMMERCIAL

## 2018-08-24 VITALS
RESPIRATION RATE: 20 BRPM | TEMPERATURE: 99.1 F | DIASTOLIC BLOOD PRESSURE: 77 MMHG | SYSTOLIC BLOOD PRESSURE: 120 MMHG | HEART RATE: 129 BPM

## 2018-08-24 DIAGNOSIS — C80.1 CANCER (HCC): ICD-10-CM

## 2018-08-24 PROCEDURE — 96360 HYDRATION IV INFUSION INIT: CPT

## 2018-08-24 PROCEDURE — 2580000003 HC RX 258: Performed by: NURSE PRACTITIONER

## 2018-08-24 PROCEDURE — 6360000002 HC RX W HCPCS: Performed by: NURSE PRACTITIONER

## 2018-08-24 RX ORDER — SODIUM CHLORIDE 0.9 % (FLUSH) 0.9 %
10 SYRINGE (ML) INJECTION PRN
Status: DISCONTINUED | OUTPATIENT
Start: 2018-08-24 | End: 2018-08-25 | Stop reason: HOSPADM

## 2018-08-24 RX ORDER — 0.9 % SODIUM CHLORIDE 0.9 %
1000 INTRAVENOUS SOLUTION INTRAVENOUS ONCE
Status: COMPLETED | OUTPATIENT
Start: 2018-08-24 | End: 2018-08-24

## 2018-08-24 RX ORDER — HEPARIN SODIUM (PORCINE) LOCK FLUSH IV SOLN 100 UNIT/ML 100 UNIT/ML
500 SOLUTION INTRAVENOUS PRN
Status: CANCELLED | OUTPATIENT
Start: 2018-08-24

## 2018-08-24 RX ORDER — 0.9 % SODIUM CHLORIDE 0.9 %
1000 INTRAVENOUS SOLUTION INTRAVENOUS ONCE
Status: CANCELLED | OUTPATIENT
Start: 2018-08-24 | End: 2018-08-24

## 2018-08-24 RX ORDER — HEPARIN SODIUM (PORCINE) LOCK FLUSH IV SOLN 100 UNIT/ML 100 UNIT/ML
500 SOLUTION INTRAVENOUS PRN
Status: DISCONTINUED | OUTPATIENT
Start: 2018-08-24 | End: 2018-08-25 | Stop reason: HOSPADM

## 2018-08-24 RX ORDER — SODIUM CHLORIDE 0.9 % (FLUSH) 0.9 %
10 SYRINGE (ML) INJECTION PRN
Status: CANCELLED | OUTPATIENT
Start: 2018-08-24

## 2018-08-24 RX ADMIN — HEPARIN 500 UNITS: 100 SYRINGE at 14:56

## 2018-08-24 RX ADMIN — Medication 10 ML: at 14:56

## 2018-08-24 RX ADMIN — Medication 10 ML: at 13:50

## 2018-08-24 RX ADMIN — SODIUM CHLORIDE 1000 ML: 9 INJECTION, SOLUTION INTRAVENOUS at 13:50

## 2018-09-06 ENCOUNTER — TELEPHONE (OUTPATIENT)
Dept: RADIATION ONCOLOGY | Age: 73
End: 2018-09-06

## 2018-09-13 ENCOUNTER — TELEPHONE (OUTPATIENT)
Dept: RADIATION ONCOLOGY | Age: 73
End: 2018-09-13

## 2018-10-02 ENCOUNTER — TELEPHONE (OUTPATIENT)
Dept: RADIATION ONCOLOGY | Age: 73
End: 2018-10-02

## 2018-10-02 ENCOUNTER — TELEPHONE (OUTPATIENT)
Dept: INFUSION THERAPY | Age: 73
End: 2018-10-02

## 2018-10-02 ENCOUNTER — HOSPITAL ENCOUNTER (OUTPATIENT)
Dept: RADIATION ONCOLOGY | Age: 73
Discharge: HOME | End: 2018-10-02
Payer: COMMERCIAL

## 2018-10-02 VITALS
HEART RATE: 72 BPM | RESPIRATION RATE: 18 BRPM | DIASTOLIC BLOOD PRESSURE: 70 MMHG | WEIGHT: 140.6 LBS | BODY MASS INDEX: 23.04 KG/M2 | SYSTOLIC BLOOD PRESSURE: 100 MMHG

## 2018-10-02 DIAGNOSIS — C80.1 CANCER (HCC): Primary | ICD-10-CM

## 2018-10-02 PROCEDURE — 99999 PR OFFICE/OUTPT VISIT,PROCEDURE ONLY: CPT | Performed by: NURSE PRACTITIONER

## 2018-10-02 RX ORDER — DOXYCYCLINE HYCLATE 100 MG
100 TABLET ORAL 2 TIMES DAILY
Qty: 20 TABLET | Refills: 0 | Status: SHIPPED | OUTPATIENT
Start: 2018-10-02 | End: 2018-10-12

## 2018-10-02 NOTE — PROGRESS NOTES
breast abscess. Will ask Warren Gerardo, FRANCISCO to coordinate scheduling. Encouraged patient to take ibuprofen as needed per OTC directions to help with pain as well. Cont to follow with Dr. Lilliam Caldera for medical oncology. CT Chest done on 9/17/18 at [facility] (will ask Warren Gerardo RN to call for latest results) and showed marked improvement per Dr Yaima Maya latest progress note. Started on Imfinzi and plans to receive every 2 weeks for 1 year. Next appt with Dr Lilliam Caldera on 10/9/18. I discussed follow up plans with Lulu Pennington. At this time, Dr Perico Braden will see the patient back in 3 months for a post-radiation completion follow-up visit. Lulu Pennington is to follow up with other providers involved in their care as directed (including but not limited to Medical Oncology, Primary Care, Pulmonary, and Surgery). The patient was given our contact number in the event that if at any time they change their mind and would like to return to the clinic to see either myself or one of the Radiation Oncologists, they can simply call us and we would be happy to see them sooner. Thank you for involving us in the management of this extremely pleasant patient. More than 15 min was in direct contact with pt coordinating/giving care. >50% of the visit was spent in counseling the pt on the following: Follow up care    The nurses notes were reviewed and incorporated into this assessment and plan. Questions answered to apparent satisfaction.       Felton Manjarrez, MSN, RN, APRN-CNP  Nurse Practitioner for Radiation Oncology    PHYSICIANS [facility]) [facility]: [phone] (MGW: [phone])  [facility]) [facility]:  [phone] (VRO:  [phone])  [facility]) [facility]: [phone] (LXA: [phone])

## 2018-10-03 ENCOUNTER — TELEPHONE (OUTPATIENT)
Dept: RADIATION ONCOLOGY | Age: 73
End: 2018-10-03

## 2018-10-03 ENCOUNTER — TELEPHONE (OUTPATIENT)
Dept: SURGERY | Age: 73
End: 2018-10-03

## 2018-10-03 DIAGNOSIS — L02.91 ABSCESS: ICD-10-CM

## 2018-10-03 DIAGNOSIS — C80.1 CANCER (HCC): Primary | ICD-10-CM

## 2018-10-04 ENCOUNTER — TELEPHONE (OUTPATIENT)
Dept: ADMINISTRATIVE | Age: 73
End: 2018-10-04

## 2018-10-30 DIAGNOSIS — K20.80 RADIATION-INDUCED ESOPHAGITIS: ICD-10-CM

## 2018-10-30 DIAGNOSIS — T66.XXXA RADIATION-INDUCED ESOPHAGITIS: ICD-10-CM

## 2018-10-30 RX ORDER — SUCRALFATE 1 G/1
1 TABLET ORAL 4 TIMES DAILY
Qty: 120 TABLET | Refills: 2 | Status: SHIPPED | OUTPATIENT
Start: 2018-10-30 | End: 2019-04-11

## 2019-01-10 ENCOUNTER — TELEPHONE (OUTPATIENT)
Dept: RADIATION ONCOLOGY | Age: 74
End: 2019-01-10

## 2019-04-11 ENCOUNTER — APPOINTMENT (OUTPATIENT)
Dept: CT IMAGING | Age: 74
DRG: 180 | End: 2019-04-11
Payer: COMMERCIAL

## 2019-04-11 ENCOUNTER — HOSPITAL ENCOUNTER (INPATIENT)
Age: 74
LOS: 6 days | Discharge: HOME | DRG: 180 | End: 2019-04-17
Attending: EMERGENCY MEDICINE | Admitting: FAMILY MEDICINE
Payer: COMMERCIAL

## 2019-04-11 DIAGNOSIS — G93.89 BRAIN MASS: Primary | ICD-10-CM

## 2019-04-11 DIAGNOSIS — Z85.118 HISTORY OF LUNG CANCER: ICD-10-CM

## 2019-04-11 PROBLEM — C79.9 METASTATIC DISEASE (HCC): Status: ACTIVE | Noted: 2019-04-11

## 2019-04-11 LAB
ALBUMIN SERPL-MCNC: 4.6 G/DL (ref 3.5–5.2)
ALP BLD-CCNC: 97 U/L (ref 35–104)
ALT SERPL-CCNC: 12 U/L (ref 0–32)
ANION GAP SERPL CALCULATED.3IONS-SCNC: 10 MMOL/L (ref 7–16)
AST SERPL-CCNC: 20 U/L (ref 0–31)
BASOPHILS ABSOLUTE: 0.08 E9/L (ref 0–0.2)
BASOPHILS RELATIVE PERCENT: 0.9 % (ref 0–2)
BILIRUB SERPL-MCNC: 0.5 MG/DL (ref 0–1.2)
BUN BLDV-MCNC: 27 MG/DL (ref 8–23)
CALCIUM SERPL-MCNC: 10.5 MG/DL (ref 8.6–10.2)
CHLORIDE BLD-SCNC: 102 MMOL/L (ref 98–107)
CO2: 28 MMOL/L (ref 22–29)
CREAT SERPL-MCNC: 1.2 MG/DL (ref 0.5–1)
EOSINOPHILS ABSOLUTE: 0.21 E9/L (ref 0.05–0.5)
EOSINOPHILS RELATIVE PERCENT: 2.3 % (ref 0–6)
GFR AFRICAN AMERICAN: 53
GFR NON-AFRICAN AMERICAN: 53 ML/MIN/1.73
GLUCOSE BLD-MCNC: 134 MG/DL (ref 74–99)
HCT VFR BLD CALC: 48.8 % (ref 34–48)
HEMOGLOBIN: 16 G/DL (ref 11.5–15.5)
IMMATURE GRANULOCYTES #: 0.06 E9/L
IMMATURE GRANULOCYTES %: 0.7 % (ref 0–5)
LYMPHOCYTES ABSOLUTE: 1.58 E9/L (ref 1.5–4)
LYMPHOCYTES RELATIVE PERCENT: 17.6 % (ref 20–42)
MCH RBC QN AUTO: 28.5 PG (ref 26–35)
MCHC RBC AUTO-ENTMCNC: 32.8 % (ref 32–34.5)
MCV RBC AUTO: 87 FL (ref 80–99.9)
MONOCYTES ABSOLUTE: 0.68 E9/L (ref 0.1–0.95)
MONOCYTES RELATIVE PERCENT: 7.6 % (ref 2–12)
NEUTROPHILS ABSOLUTE: 6.37 E9/L (ref 1.8–7.3)
NEUTROPHILS RELATIVE PERCENT: 70.9 % (ref 43–80)
PDW BLD-RTO: 15.2 FL (ref 11.5–15)
PLATELET # BLD: 275 E9/L (ref 130–450)
PMV BLD AUTO: 9.9 FL (ref 7–12)
POTASSIUM SERPL-SCNC: 3.8 MMOL/L (ref 3.5–5)
RBC # BLD: 5.61 E12/L (ref 3.5–5.5)
SODIUM BLD-SCNC: 140 MMOL/L (ref 132–146)
T4 TOTAL: 7.6 MCG/DL (ref 4.5–11.7)
TOTAL PROTEIN: 9 G/DL (ref 6.4–8.3)
TROPONIN: <0.01 NG/ML (ref 0–0.03)
TSH SERPL DL<=0.05 MIU/L-ACNC: 8.97 UIU/ML (ref 0.27–4.2)
WBC # BLD: 9 E9/L (ref 4.5–11.5)

## 2019-04-11 PROCEDURE — 71270 CT THORAX DX C-/C+: CPT

## 2019-04-11 PROCEDURE — 36415 COLL VENOUS BLD VENIPUNCTURE: CPT

## 2019-04-11 PROCEDURE — 6370000000 HC RX 637 (ALT 250 FOR IP): Performed by: FAMILY MEDICINE

## 2019-04-11 PROCEDURE — 6360000002 HC RX W HCPCS: Performed by: FAMILY MEDICINE

## 2019-04-11 PROCEDURE — 84443 ASSAY THYROID STIM HORMONE: CPT

## 2019-04-11 PROCEDURE — 80053 COMPREHEN METABOLIC PANEL: CPT

## 2019-04-11 PROCEDURE — 6370000000 HC RX 637 (ALT 250 FOR IP): Performed by: EMERGENCY MEDICINE

## 2019-04-11 PROCEDURE — 2580000003 HC RX 258: Performed by: EMERGENCY MEDICINE

## 2019-04-11 PROCEDURE — 74178 CT ABD&PLV WO CNTR FLWD CNTR: CPT

## 2019-04-11 PROCEDURE — 1200000000 HC SEMI PRIVATE

## 2019-04-11 PROCEDURE — 84436 ASSAY OF TOTAL THYROXINE: CPT

## 2019-04-11 PROCEDURE — 99285 EMERGENCY DEPT VISIT HI MDM: CPT

## 2019-04-11 PROCEDURE — 84484 ASSAY OF TROPONIN QUANT: CPT

## 2019-04-11 PROCEDURE — 99222 1ST HOSP IP/OBS MODERATE 55: CPT | Performed by: NEUROLOGICAL SURGERY

## 2019-04-11 PROCEDURE — 6360000004 HC RX CONTRAST MEDICATION: Performed by: RADIOLOGY

## 2019-04-11 PROCEDURE — 6360000002 HC RX W HCPCS: Performed by: EMERGENCY MEDICINE

## 2019-04-11 PROCEDURE — 72125 CT NECK SPINE W/O DYE: CPT

## 2019-04-11 PROCEDURE — 85025 COMPLETE CBC W/AUTO DIFF WBC: CPT

## 2019-04-11 PROCEDURE — 2580000003 HC RX 258: Performed by: RADIOLOGY

## 2019-04-11 PROCEDURE — 70450 CT HEAD/BRAIN W/O DYE: CPT

## 2019-04-11 PROCEDURE — 93005 ELECTROCARDIOGRAM TRACING: CPT

## 2019-04-11 RX ORDER — DEXAMETHASONE SODIUM PHOSPHATE 4 MG/ML
4 INJECTION, SOLUTION INTRA-ARTICULAR; INTRALESIONAL; INTRAMUSCULAR; INTRAVENOUS; SOFT TISSUE EVERY 6 HOURS
Status: DISCONTINUED | OUTPATIENT
Start: 2019-04-11 | End: 2019-04-17

## 2019-04-11 RX ORDER — 0.9 % SODIUM CHLORIDE 0.9 %
500 INTRAVENOUS SOLUTION INTRAVENOUS ONCE
Status: COMPLETED | OUTPATIENT
Start: 2019-04-11 | End: 2019-04-11

## 2019-04-11 RX ORDER — LEVOTHYROXINE SODIUM 0.03 MG/1
25 TABLET ORAL DAILY
Refills: 3 | COMMUNITY
Start: 2019-03-06

## 2019-04-11 RX ORDER — HYDROCODONE BITARTRATE AND ACETAMINOPHEN 10; 325 MG/1; MG/1
1 TABLET ORAL EVERY 4 HOURS PRN
Status: DISCONTINUED | OUTPATIENT
Start: 2019-04-11 | End: 2019-04-17 | Stop reason: HOSPADM

## 2019-04-11 RX ORDER — SODIUM CHLORIDE 0.9 % (FLUSH) 0.9 %
10 SYRINGE (ML) INJECTION
Status: COMPLETED | OUTPATIENT
Start: 2019-04-11 | End: 2019-04-11

## 2019-04-11 RX ORDER — TRIAMTERENE AND HYDROCHLOROTHIAZIDE 37.5; 25 MG/1; MG/1
1 TABLET ORAL DAILY
Status: DISCONTINUED | OUTPATIENT
Start: 2019-04-11 | End: 2019-04-17 | Stop reason: HOSPADM

## 2019-04-11 RX ORDER — HYDROCODONE BITARTRATE AND ACETAMINOPHEN 10; 325 MG/1; MG/1
TABLET ORAL
Refills: 0 | COMMUNITY
Start: 2019-03-19

## 2019-04-11 RX ORDER — DEXAMETHASONE SODIUM PHOSPHATE 10 MG/ML
10 INJECTION INTRAMUSCULAR; INTRAVENOUS ONCE
Status: COMPLETED | OUTPATIENT
Start: 2019-04-11 | End: 2019-04-11

## 2019-04-11 RX ORDER — MIRTAZAPINE 15 MG/1
30 TABLET, FILM COATED ORAL NIGHTLY
Status: DISCONTINUED | OUTPATIENT
Start: 2019-04-11 | End: 2019-04-17 | Stop reason: HOSPADM

## 2019-04-11 RX ORDER — TRIAMTERENE AND HYDROCHLOROTHIAZIDE 37.5; 25 MG/1; MG/1
1 TABLET ORAL DAILY
Refills: 12 | COMMUNITY
Start: 2019-03-19

## 2019-04-11 RX ORDER — LORAZEPAM 2 MG/ML
1 INJECTION INTRAMUSCULAR ONCE
Status: COMPLETED | OUTPATIENT
Start: 2019-04-12 | End: 2019-04-12

## 2019-04-11 RX ORDER — ACETAMINOPHEN 500 MG
1000 TABLET ORAL ONCE
Status: COMPLETED | OUTPATIENT
Start: 2019-04-11 | End: 2019-04-11

## 2019-04-11 RX ADMIN — IOPAMIDOL 110 ML: 755 INJECTION, SOLUTION INTRAVENOUS at 19:21

## 2019-04-11 RX ADMIN — Medication 10 ML: at 19:21

## 2019-04-11 RX ADMIN — DEXAMETHASONE SODIUM PHOSPHATE 4 MG: 4 INJECTION, SOLUTION INTRAMUSCULAR; INTRAVENOUS at 18:46

## 2019-04-11 RX ADMIN — ACETAMINOPHEN 1000 MG: 500 TABLET ORAL at 12:34

## 2019-04-11 RX ADMIN — DEXAMETHASONE SODIUM PHOSPHATE 10 MG: 10 INJECTION INTRAMUSCULAR; INTRAVENOUS at 12:34

## 2019-04-11 RX ADMIN — MIRTAZAPINE 30 MG: 15 TABLET, FILM COATED ORAL at 20:38

## 2019-04-11 RX ADMIN — IOHEXOL 50 ML: 240 INJECTION, SOLUTION INTRATHECAL; INTRAVASCULAR; INTRAVENOUS; ORAL at 19:21

## 2019-04-11 RX ADMIN — SODIUM CHLORIDE 500 ML: 9 INJECTION, SOLUTION INTRAVENOUS at 12:35

## 2019-04-11 ASSESSMENT — ENCOUNTER SYMPTOMS
NAUSEA: 0
SHORTNESS OF BREATH: 0
PHOTOPHOBIA: 0
BACK PAIN: 0
ABDOMINAL PAIN: 0
VOMITING: 0
TROUBLE SWALLOWING: 0

## 2019-04-11 ASSESSMENT — PAIN SCALES - GENERAL
PAINLEVEL_OUTOF10: 0
PAINLEVEL_OUTOF10: 0
PAINLEVEL_OUTOF10: 5
PAINLEVEL_OUTOF10: 5
PAINLEVEL_OUTOF10: 0

## 2019-04-11 NOTE — CONSULTS
NEUROSURGERY CONSULTATION     Krissy Velázquez is being referred by Dr. Morales Goldberg as a consultation for evaluation of brain lesion      Chief Complaint   Patient presents with    Headache     fell in basement two days ago hitting head +LOC, pain in forehead is persistent -thinners   . Chief Complaint: HA after fall    HPI:   Krissy Velázquez is a 74 y.o.  female who has history of HTN, chronic bronchitis, chronic back pain, hysterectomy, cholecystectomy, cyst removal from left parotid gland, and right parotidectomy with current chemotherapy treatment. Last treatment was 2 weeks ago. She was not taking any blood thinners. Pt presents to the ED c/o HA after falling 2 days ago. Family is in the room and provides most of the history. Complaint is persistent, moderate in severity, and worsened by nothing. Pt states she does not remember falling. Unknown LOC. Family states she has had memory issues since she started chemotheray 8 months ago. Also has had increasing headaches the past week. CT head demonstrates a mass of the left posterior parietal occipital junction 2.6 cm compressing the left lateral ventricle with vasogenic edema and midline shift for which neurosurgery was consulted.      Past Medical History:   Diagnosis Date    Chronic back pain     Chronic bronchitis ([address]     doing well as of 10/6/2017    Hypertension     Mass of parotid gland     right    PONV (postoperative nausea and vomiting)     Wears dentures     full upper/partial bottom     Past Surgical History:   Procedure Laterality Date    ABSCESS DRAINAGE  1980's    several / breast    CHOLECYSTECTOMY  1980's    open    HYSTERECTOMY  1970's    OTHER SURGICAL HISTORY Left 07/24/2017    superficial parotoidectomy(cyst removal)    PAROTIDECTOMY Right     superficial parotidectomy    WRIST SURGERY Left 1980's    orif      Family History   Problem Relation Age of Onset    Cancer Mother         colon    Other Father         brain tumor Assessment:   New problem: new left parietal-occipital brain lesion, probable metastatic disease  Hx of stage 3 adenocarcinoma of lung     Mario Joshi is 74years old. She has history of stage III adenocarcinoma of the lung. She had final needle aspiration of the left upper lobe mass on May 17, 2018. She has been receiving chemotherapy and has also received radiation. She also has hx history of right parotidectomy, hysterectomy, and cholecystectomy. She previously smoked. 4/11 HCT:  mass of the left posterior parietal occipital junction 2.6 cm, surrounding vasogenic edema    Plan:  -MRI brain with and without contrast pending.   -Radiation Oncology consulted. Electronically signed by Melissa Harper PA-C on 4/11/2019 at 3:18 PM       I independently saw, evaluated, and examined the patient. Agree with plan outlined above. I independently reviewed the patient's imaging and laboratory results. CT scan demonstrates the left parietal occipital mass. It measures 2.6 cm. MRI of the brain is pending. Na 140.  Hb 16.0  Electronically signed by Samantha Allred MD on 4/11/2019 at 8:56 PM

## 2019-04-11 NOTE — CARE COORDINATION
4/11/2019 social work transition of care  Pt is from home and had been independent. Pt did not report any hx of snf or hhc. Pt pcp is Dr. Virginia Villafuerte and pharmacy is [facility]. Explained social work role in transition of care. Pt plan is home, son Deuce [phone]. Sw will follow and assist prn.   Electronically signed by EMMY Blair on 4/11/2019 at 3:58 PM

## 2019-04-11 NOTE — ED PROVIDER NOTES
Allergies: Patient has no known allergies. Physical Exam   Oxygen Saturation Interpretation: Normal.  ED Triage Vitals   BP Temp Temp Source Pulse Resp SpO2 Height Weight   04/11/19 0939 04/11/19 0916 04/11/19 0916 04/11/19 0916 04/11/19 0939 04/11/19 0916 04/11/19 0939 04/11/19 0939   121/82 97.9 °F (36.6 °C) Temporal 104 18 93 % 5' 5.5\" (1.664 m) 140 lb (63.5 kg)       Physical Exam  · Constitutional:  Alert, development consistent with age. · HEENT:  NC/NT. Airway patent. · Neck:  No midline or paravertebral tenderness. Normal ROM. Supple. · Chest:  Symmetrical without visible rash or tenderness. · Respiratory:  Lungs Clear to auscultation and breath sounds equal.  · CV:  Regular rate and rhythm, normal heart sounds, without pathological murmurs, ectopy, gallops, or rubs. · GI:  Abdomen Soft, nontender, good bowel sounds. No firm or pulsatile mass. · Pelvis:  Stable, nontender to palpation. · Back:  No midline or paravertebral tenderness. No costovertebral tenderness. · Extremities: No tenderness or edema noted. · Integument:  Normal turgor. Warm, dry, without visible rash, unless noted elsewhere. · Lymphatic: no lymphadenopathy noted  · Neurological:  Oriented x3, GCS 15. Motor functions intact.      Lab / Imaging Results   (All laboratory and radiology results have been personally reviewed by myself)  Labs:  Results for orders placed or performed during the hospital encounter of 04/11/19   CBC Auto Differential   Result Value Ref Range    WBC 9.0 4.5 - 11.5 E9/L    RBC 5.61 (H) 3.50 - 5.50 E12/L    Hemoglobin 16.0 (H) 11.5 - 15.5 g/dL    Hematocrit 48.8 (H) 34.0 - 48.0 %    MCV 87.0 80.0 - 99.9 fL    MCH 28.5 26.0 - 35.0 pg    MCHC 32.8 32.0 - 34.5 %    RDW 15.2 (H) 11.5 - 15.0 fL    Platelets 798 680 - 011 E9/L    MPV 9.9 7.0 - 12.0 fL    Neutrophils % 70.9 43.0 - 80.0 %    Immature Granulocytes % 0.7 0.0 - 5.0 %    Lymphocytes % 17.6 (L) 20.0 - 42.0 %    Monocytes % 7.6 2.0 - 12.0 % Eosinophils % 2.3 0.0 - 6.0 %    Basophils % 0.9 0.0 - 2.0 %    Neutrophils # 6.37 1.80 - 7.30 E9/L    Immature Granulocytes # 0.06 E9/L    Lymphocytes # 1.58 1.50 - 4.00 E9/L    Monocytes # 0.68 0.10 - 0.95 E9/L    Eosinophils # 0.21 0.05 - 0.50 E9/L    Basophils # 0.08 0.00 - 0.20 E9/L   Comprehensive Metabolic Panel   Result Value Ref Range    Sodium 140 132 - 146 mmol/L    Potassium 3.8 3.5 - 5.0 mmol/L    Chloride 102 98 - 107 mmol/L    CO2 28 22 - 29 mmol/L    Anion Gap 10 7 - 16 mmol/L    Glucose 134 (H) 74 - 99 mg/dL    BUN 27 (H) 8 - 23 mg/dL    CREATININE 1.2 (H) 0.5 - 1.0 mg/dL    GFR Non-African American 53 >=60 mL/min/1.73    GFR African American 53     Calcium 10.5 (H) 8.6 - 10.2 mg/dL    Total Protein 9.0 (H) 6.4 - 8.3 g/dL    Alb 4.6 3.5 - 5.2 g/dL    Total Bilirubin 0.5 0.0 - 1.2 mg/dL    Alkaline Phosphatase 97 35 - 104 U/L    ALT 12 0 - 32 U/L    AST 20 0 - 31 U/L   Troponin   Result Value Ref Range    Troponin <0.01 0.00 - 0.03 ng/mL   TSH without Reflex   Result Value Ref Range    TSH 8.970 (H) 0.270 - 4.200 uIU/mL   T4   Result Value Ref Range    T4, Total 7.6 4.5 - 11.7 mcg/dL   EKG 12 Lead   Result Value Ref Range    Ventricular Rate 94 BPM    Atrial Rate 94 BPM    P-R Interval 158 ms    QRS Duration 76 ms    Q-T Interval 360 ms    QTc Calculation (Bazett) 450 ms    P Axis 83 degrees    R Axis 107 degrees    T Axis 83 degrees       Imaging: All Radiology results interpreted by Radiologist unless otherwise noted. CT Head WO Contrast   Final Result   Mass in the left posterior parietal occipital junction measuring   approximately 2.6 cm compressing the left lateral ventricle. There is   significant amount of left hemispheric vasogenic edema and midline   shift to the right. Findings are compatible with either a primary or   metastatic malignancy.       Recommend follow-up MRI the brain without and with contrast      Mild to moderate degenerative changes cervical spine without acute   process

## 2019-04-12 ENCOUNTER — HOSPITAL ENCOUNTER (OUTPATIENT)
Dept: RADIATION ONCOLOGY | Age: 74
Discharge: HOME | End: 2019-04-12
Payer: COMMERCIAL

## 2019-04-12 ENCOUNTER — APPOINTMENT (OUTPATIENT)
Dept: MRI IMAGING | Age: 74
DRG: 180 | End: 2019-04-12
Payer: COMMERCIAL

## 2019-04-12 DIAGNOSIS — C80.1 CANCER (HCC): Primary | ICD-10-CM

## 2019-04-12 LAB
ANION GAP SERPL CALCULATED.3IONS-SCNC: 14 MMOL/L (ref 7–16)
BUN BLDV-MCNC: 25 MG/DL (ref 8–23)
CALCIUM SERPL-MCNC: 10.4 MG/DL (ref 8.6–10.2)
CHLORIDE BLD-SCNC: 98 MMOL/L (ref 98–107)
CO2: 22 MMOL/L (ref 22–29)
CREAT SERPL-MCNC: 0.8 MG/DL (ref 0.5–1)
GFR AFRICAN AMERICAN: >60
GFR NON-AFRICAN AMERICAN: >60 ML/MIN/1.73
GLUCOSE BLD-MCNC: 159 MG/DL (ref 74–99)
HCT VFR BLD CALC: 46.2 % (ref 34–48)
HEMOGLOBIN: 15.4 G/DL (ref 11.5–15.5)
MCH RBC QN AUTO: 28.8 PG (ref 26–35)
MCHC RBC AUTO-ENTMCNC: 33.3 % (ref 32–34.5)
MCV RBC AUTO: 86.4 FL (ref 80–99.9)
PDW BLD-RTO: 15.1 FL (ref 11.5–15)
PLATELET # BLD: 281 E9/L (ref 130–450)
PMV BLD AUTO: 10.3 FL (ref 7–12)
POTASSIUM SERPL-SCNC: 4.1 MMOL/L (ref 3.5–5)
RBC # BLD: 5.35 E12/L (ref 3.5–5.5)
SODIUM BLD-SCNC: 134 MMOL/L (ref 132–146)
WBC # BLD: 14.5 E9/L (ref 4.5–11.5)

## 2019-04-12 PROCEDURE — 6360000002 HC RX W HCPCS: Performed by: FAMILY MEDICINE

## 2019-04-12 PROCEDURE — 6370000000 HC RX 637 (ALT 250 FOR IP): Performed by: FAMILY MEDICINE

## 2019-04-12 PROCEDURE — 36415 COLL VENOUS BLD VENIPUNCTURE: CPT

## 2019-04-12 PROCEDURE — 85027 COMPLETE CBC AUTOMATED: CPT

## 2019-04-12 PROCEDURE — 1200000000 HC SEMI PRIVATE

## 2019-04-12 PROCEDURE — 2580000003 HC RX 258: Performed by: FAMILY MEDICINE

## 2019-04-12 PROCEDURE — 99232 SBSQ HOSP IP/OBS MODERATE 35: CPT | Performed by: NEUROLOGICAL SURGERY

## 2019-04-12 PROCEDURE — 80048 BASIC METABOLIC PNL TOTAL CA: CPT

## 2019-04-12 PROCEDURE — 99999 PR OFFICE/OUTPT VISIT,PROCEDURE ONLY: CPT | Performed by: RADIOLOGY

## 2019-04-12 RX ORDER — ACETAMINOPHEN 325 MG/1
650 TABLET ORAL EVERY 6 HOURS PRN
Status: DISCONTINUED | OUTPATIENT
Start: 2019-04-12 | End: 2019-04-17 | Stop reason: HOSPADM

## 2019-04-12 RX ORDER — SODIUM CHLORIDE 0.9 % (FLUSH) 0.9 %
10 SYRINGE (ML) INJECTION 2 TIMES DAILY
Status: DISCONTINUED | OUTPATIENT
Start: 2019-04-12 | End: 2019-04-17 | Stop reason: HOSPADM

## 2019-04-12 RX ORDER — LORAZEPAM 2 MG/ML
2 INJECTION INTRAMUSCULAR ONCE
Status: DISCONTINUED | OUTPATIENT
Start: 2019-04-12 | End: 2019-04-17 | Stop reason: HOSPADM

## 2019-04-12 RX ORDER — CALCIUM CARBONATE 200(500)MG
1000 TABLET,CHEWABLE ORAL 3 TIMES DAILY PRN
Status: DISCONTINUED | OUTPATIENT
Start: 2019-04-12 | End: 2019-04-17 | Stop reason: HOSPADM

## 2019-04-12 RX ADMIN — Medication 10 ML: at 16:49

## 2019-04-12 RX ADMIN — Medication 10 ML: at 20:42

## 2019-04-12 RX ADMIN — DEXAMETHASONE SODIUM PHOSPHATE 4 MG: 4 INJECTION, SOLUTION INTRAMUSCULAR; INTRAVENOUS at 01:48

## 2019-04-12 RX ADMIN — CALCIUM CARBONATE (ANTACID) CHEW TAB 500 MG 1000 MG: 500 CHEW TAB at 01:12

## 2019-04-12 RX ADMIN — LORAZEPAM 1 MG: 2 INJECTION, SOLUTION INTRAMUSCULAR; INTRAVENOUS at 11:07

## 2019-04-12 RX ADMIN — DEXAMETHASONE SODIUM PHOSPHATE 4 MG: 4 INJECTION, SOLUTION INTRAMUSCULAR; INTRAVENOUS at 23:13

## 2019-04-12 RX ADMIN — HYDROCODONE BITARTRATE AND ACETAMINOPHEN 1 TABLET: 10; 325 TABLET ORAL at 20:42

## 2019-04-12 RX ADMIN — DEXAMETHASONE SODIUM PHOSPHATE 4 MG: 4 INJECTION, SOLUTION INTRAMUSCULAR; INTRAVENOUS at 09:14

## 2019-04-12 RX ADMIN — Medication 10 ML: at 09:14

## 2019-04-12 RX ADMIN — DEXAMETHASONE SODIUM PHOSPHATE 4 MG: 4 INJECTION, SOLUTION INTRAMUSCULAR; INTRAVENOUS at 16:49

## 2019-04-12 RX ADMIN — MIRTAZAPINE 30 MG: 15 TABLET, FILM COATED ORAL at 20:42

## 2019-04-12 RX ADMIN — TRIAMTERENE AND HYDROCHLOROTHIAZIDE 1 TABLET: 37.5; 25 TABLET ORAL at 09:15

## 2019-04-12 ASSESSMENT — PAIN DESCRIPTION - PAIN TYPE
TYPE: ACUTE PAIN
TYPE: ACUTE PAIN

## 2019-04-12 ASSESSMENT — PAIN - FUNCTIONAL ASSESSMENT: PAIN_FUNCTIONAL_ASSESSMENT: PREVENTS OR INTERFERES WITH ALL ACTIVE AND SOME PASSIVE ACTIVITIES

## 2019-04-12 ASSESSMENT — PAIN DESCRIPTION - DESCRIPTORS
DESCRIPTORS: THROBBING;DISCOMFORT;ACHING
DESCRIPTORS: HEADACHE;THROBBING

## 2019-04-12 ASSESSMENT — PAIN SCALES - GENERAL
PAINLEVEL_OUTOF10: 0
PAINLEVEL_OUTOF10: 0
PAINLEVEL_OUTOF10: 7
PAINLEVEL_OUTOF10: 0
PAINLEVEL_OUTOF10: 0
PAINLEVEL_OUTOF10: 4
PAINLEVEL_OUTOF10: 0
PAINLEVEL_OUTOF10: 0

## 2019-04-12 ASSESSMENT — PAIN DESCRIPTION - FREQUENCY
FREQUENCY: INTERMITTENT
FREQUENCY: INTERMITTENT

## 2019-04-12 ASSESSMENT — ENCOUNTER SYMPTOMS
EYE DISCHARGE: 0
SHORTNESS OF BREATH: 0
ABDOMINAL PAIN: 0

## 2019-04-12 ASSESSMENT — PAIN DESCRIPTION - PROGRESSION: CLINICAL_PROGRESSION: GRADUALLY WORSENING

## 2019-04-12 ASSESSMENT — PAIN DESCRIPTION - LOCATION
LOCATION: HEAD
LOCATION: HEAD

## 2019-04-12 ASSESSMENT — PAIN DESCRIPTION - ONSET: ONSET: GRADUAL

## 2019-04-12 NOTE — H&P
Cornell Hernandez is an 74 y.o.  female. Patient says she fell at home a few days ago and hit her head. She says that she has a mild headache at this time. CT in the ER revealed a new brain mass. She says she feels fair today.     Past Medical History:   Diagnosis Date    Chronic back pain     Chronic bronchitis ([address]     doing well as of 10/6/2017    Hypertension     Mass of parotid gland     right    PONV (postoperative nausea and vomiting)     Wears dentures     full upper/partial bottom     Past Surgical History:   Procedure Laterality Date    ABSCESS DRAINAGE      several / breast    CHOLECYSTECTOMY      open    HYSTERECTOMY      OTHER SURGICAL HISTORY Left 2017    superficial parotoidectomy(cyst removal)    PAROTIDECTOMY Right     superficial parotidectomy    WRIST SURGERY Left     orif       Family History   Problem Relation Age of Onset    Cancer Mother         colon    Other Father         brain tumor    Cancer Sister         colon    Diabetes Brother     Cancer Other         -niece on mom's side       Social History     Tobacco Use    Smoking status: Former Smoker     Packs/day: 1.00     Years: 50.00     Pack years: 50.00     Last attempt to quit: 2018     Years since quittin.1    Smokeless tobacco: Never Used   Substance Use Topics    Alcohol use: No    Drug use: No       Current Facility-Administered Medications   Medication Dose Route Frequency Provider Last Rate Last Dose    calcium carbonate (TUMS) chewable tablet 1,000 mg  1,000 mg Oral TID PRN Elijah Zhu MD   1,000 mg at 19 0112    HYDROcodone-acetaminophen (NORCO)  MG per tablet 1 tablet  1 tablet Oral Q4H PRN Elijah Zhu MD        mirtazapine (REMERON) tablet 30 mg  30 mg Oral Nightly Elijah Zhu MD   30 mg at 19    triamterene-hydrochlorothiazide (MAXZIDE-25) 37.5-25 MG per tablet 1 tablet  1 tablet Oral Daily Rosita Germain MD        dexamethasone (DECADRON) injection 4 mg  4 mg Intravenous Q6H Rosita Germain MD   4 mg at 04/12/19 0148    enoxaparin (LOVENOX) injection 30 mg  30 mg Subcutaneous Daily Rosita Germain MD        LORazepam (ATIVAN) injection 1 mg  1 mg Intravenous Once Rosita Germain MD           Allergies: No Known Allergies    Active Problems:    Metastatic disease ([address]    Brain mass  Resolved Problems:    * No resolved hospital problems. *    Blood pressure 117/73, pulse 95, temperature 97.2 °F (36.2 °C), temperature source Temporal, resp. rate 16, height 5' 5.5\" (1.664 m), weight 140 lb (63.5 kg), SpO2 97 %, not currently breastfeeding. Review of Systems   Constitutional: Negative for activity change. HENT: Negative for congestion. Eyes: Negative for discharge. Respiratory: Negative for shortness of breath. Cardiovascular: Negative for chest pain. Gastrointestinal: Negative for abdominal pain. Endocrine: Negative for cold intolerance. Genitourinary: Negative for difficulty urinating. Musculoskeletal: Negative for arthralgias. Neurological: Positive for headaches. Negative for dizziness. Hematological: Negative for adenopathy. Psychiatric/Behavioral: Negative for agitation. Physical Exam   Constitutional: She is oriented to person, place, and time. She appears well-developed. HENT:   Head: Normocephalic. Eyes: Pupils are equal, round, and reactive to light. EOM are normal.   Neck: Normal range of motion. Neck supple. No thyromegaly present. Cardiovascular: Normal rate, regular rhythm and normal heart sounds. Pulmonary/Chest: Effort normal and breath sounds normal. No stridor. No respiratory distress. Abdominal: Soft. Bowel sounds are normal. She exhibits no distension. There is no tenderness. Musculoskeletal: Normal range of motion. Neurological: She is alert and oriented to person, place, and time. No cranial nerve deficit. Coordination normal.   Skin: Skin is warm and dry. Capillary refill takes less than 2 seconds. Psychiatric: She has a normal mood and affect. Assessment:  Brain mass  Lung cancer  HTN    Plan:  Neurosurgery following. MRI brain pending. Radiation oncology to see. Continue home meds.     Ar Ascencio MD  4/12/2019

## 2019-04-12 NOTE — PLAN OF CARE
Problem: Falls - Risk of:  Goal: Will remain free from falls  Description  Will remain free from falls  4/12/2019 1350 by Kristine Chan RN  Outcome: Met This Shift  4/12/2019 0144 by Manpreet Ascencio RN  Outcome: Met This Shift  Goal: Absence of physical injury  Description  Absence of physical injury  Outcome: Met This Shift     Problem: Risk for Impaired Skin Integrity  Goal: Tissue integrity - skin and mucous membranes  Description  Structural intactness and normal physiological function of skin and  mucous membranes.   Outcome: Met This Shift

## 2019-04-12 NOTE — PROGRESS NOTES
Patient to have MRI under sedation. MRI will speak to anesthesia in a.m. to determine availability and time. Patient will be made NPO at 2100 tonight. MRI will contact [facility] to notify of time and/or availability. Dr. Tracy Druon aware of plan.

## 2019-04-12 NOTE — PROGRESS NOTES
Radiation/Oncology consult Perfect Served to Dr. Laurel Dakins at 2118 on 4/11/19. Awaiting response.

## 2019-04-12 NOTE — PROGRESS NOTES
Radiation Oncology          -consult received  -MRI pending   -pt off floor for MRI when I went by today  -will meet with Yuniel Rivers and discuss options post MR +/- crani          Jung Marr.  Yessenia Mcdonough MD [place] [address] Oncology  Cell: [phone]    [facility]:  [phone]   FAX: [phone] [address] [facility] [address]:  [address] [place] [address] Avenue:    [phone]  [address] [place] [address] Road:  [address] [place] [address] Road:  [phone]

## 2019-04-12 NOTE — PROGRESS NOTES
Department of Neurosurgery  Progress Note    CHIEF COMPLAINT: brain lesion     SUBJECTIVE:  Awake and alert. Continued HA. Plan for MRI tomorrow under sedation. No new issues overnight. REVIEW OF SYSTEMS :  Constitutional: Negative for chills and fever. Neurological: Negative for dizziness, tremors and speech change. OBJECTIVE:   VITALS:  /74   Pulse 98   Temp 97.3 °F (36.3 °C) (Temporal)   Resp 16   Ht 5' 5.5\" (1.664 m)   Wt 140 lb (63.5 kg)   SpO2 95%   BMI 22.94 kg/m²     PHYSICAL:  Constitutional: She appears well-developed and well-nourished. HENT:   Head: Normocephalic. Eyes: Pupils are equal, round, and reactive to light. Conjunctivae and EOM are normal.   Visual fields full to gross examination. Neck: Normal range of motion. No tracheal deviation present. Cardiovascular: Normal rate. Pulmonary/Chest: Effort normal.   Abdominal: She exhibits no distension. Musculoskeletal: Normal range of motion. Neurological:    Alert to self and place, not to year   Face symmetric    Motor strength symmetric and full   Sensation intact to light touch    Skin: Skin is warm and dry.    Psychiatric: Thought content normal.         DATA:  CBC:   Lab Results   Component Value Date    WBC 14.5 04/12/2019    RBC 5.35 04/12/2019    HGB 15.4 04/12/2019    HCT 46.2 04/12/2019    MCV 86.4 04/12/2019    MCH 28.8 04/12/2019    MCHC 33.3 04/12/2019    RDW 15.1 04/12/2019     04/12/2019    MPV 10.3 04/12/2019     BMP:    Lab Results   Component Value Date     04/12/2019    K 4.1 04/12/2019    CL 98 04/12/2019    CO2 22 04/12/2019    BUN 25 04/12/2019    LABALBU 4.6 04/11/2019    CREATININE 0.8 04/12/2019    CALCIUM 10.4 04/12/2019    GFRAA >60 04/12/2019    LABGLOM >60 04/12/2019    GLUCOSE 159 04/12/2019     PT/INR:  No results found for: PROTIME, INR  PTT:  No results found for: APTT, PTT[APTT}    Current Inpatient Medications  Current Facility-Administered Medications: calcium carbonate

## 2019-04-12 NOTE — PROCEDURES
Patient severely claustrophobic and refusing MRI at this time. Astrid Manning RN was notified.  8 pm 4/11/19

## 2019-04-12 NOTE — PROGRESS NOTES
Subjective: The patient is awake and alert. Complains of headache but no tremors, incontinence, seizures or slurred speech. Not wearing her glasses, so unsure about her vision at this time. Unable to tolerate MRI brain despite taking anxiolytic. Denies chest pain, angina, dyspnea, abdominal discomfort. No nausea or vomiting. Objective:    /74   Pulse 98   Temp 97.3 °F (36.3 °C) (Temporal)   Resp 16   Ht 5' 5.5\" (1.664 m)   Wt 140 lb (63.5 kg)   SpO2 95%   BMI 22.94 kg/m²     General: NAD  HEENT: No thrush or mucositis, EOMI, PERRLA  Heart:  RRR, no murmurs, gallops, or rubs.   Lungs:  CTA bilaterally, no wheeze, rales or rhonchi  Abd: bowel sounds present, nontender, nondistended, no masses  Extrem:  No clubbing, cyanosis, or edema  Lymphatics: No palpable adenopathy in cervical and supraclavicular regions  Skin: Intact, no petechia or purpura    CBC with Differential:    Lab Results   Component Value Date    WBC 14.5 04/12/2019    RBC 5.35 04/12/2019    HGB 15.4 04/12/2019    HCT 46.2 04/12/2019     04/12/2019    MCV 86.4 04/12/2019    MCH 28.8 04/12/2019    MCHC 33.3 04/12/2019    RDW 15.1 04/12/2019    LYMPHOPCT 17.6 04/11/2019    MONOPCT 7.6 04/11/2019    BASOPCT 0.9 04/11/2019    MONOSABS 0.68 04/11/2019    LYMPHSABS 1.58 04/11/2019    EOSABS 0.21 04/11/2019    BASOSABS 0.08 04/11/2019     CMP:    Lab Results   Component Value Date     04/12/2019    K 4.1 04/12/2019    CL 98 04/12/2019    CO2 22 04/12/2019    BUN 25 04/12/2019    CREATININE 0.8 04/12/2019    GFRAA >60 04/12/2019    LABGLOM >60 04/12/2019    GLUCOSE 159 04/12/2019    PROT 9.0 04/11/2019    LABALBU 4.6 04/11/2019    CALCIUM 10.4 04/12/2019    BILITOT 0.5 04/11/2019    ALKPHOS 97 04/11/2019    AST 20 04/11/2019    ALT 12 04/11/2019      {[id number]    Current Facility-Administered Medications:     calcium carbonate (TUMS) chewable tablet 1,000 mg, 1,000 mg, Oral, TID PRN, Rosy Love MD, 1,000 mg at 04/12/19 0112    sodium chloride flush 0.9 % injection 10 mL, 10 mL, Intravenous, BID, Cora Bull MD, 10 mL at 04/12/19 0914    LORazepam (ATIVAN) injection 2 mg, 2 mg, Intravenous, Once, Cora Bull MD    HYDROcodone-acetaminophen [facility])  MG per tablet 1 tablet, 1 tablet, Oral, Q4H PRN, Cora Bull MD    mirtazapine (REMERON) tablet 30 mg, 30 mg, Oral, Nightly, Cora Bull MD, 30 mg at 04/11/19 2038    triamterene-hydrochlorothiazide (MAXZIDE-25) 37.5-25 MG per tablet 1 tablet, 1 tablet, Oral, Daily, Cora Bull MD, 1 tablet at 04/12/19 0915    dexamethasone (DECADRON) injection 4 mg, 4 mg, Intravenous, Q6H, Cora Bull MD, 4 mg at 04/12/19 0914    enoxaparin (LOVENOX) injection 30 mg, 30 mg, Subcutaneous, Daily, Cora Bull MD    Assessment:    Active Problems:    Metastatic disease [address] [address]    Brain mass  Resolved Problems:    * No resolved hospital problems. *    74year old well known to me with stage IIIC locally advanced adenocarcinoma of the left upper lobe. Treated with concurrent chemoRT with carboplatin and paclitaxel from 6/12/18 to 8/14/18 with good response. Currently on consolidative immunotherapy with Imfinzi since 10/23/18, last on 4/2/19. Admitted after a fall. CT head reveals a L posterior parietoccipital junction mass measuring 2.6 compressing the L lateral ventricle. Significant amount of vasogenic edema and midline shift to the right. Plan:  Check MRI brain, she was unable to tolerate with anxiolytic. Will need anesthesia. Decadron 4mg IV q 6hrs  Will recommend gamma knife RT once MRI confirms solitary lesion finding.          Electronically signed by Precious Garrison MD on 4/12/2019 at 2:41 PM

## 2019-04-12 NOTE — PROCEDURES
Second attempt at MRI with pre meds was unsuccessful , the patient did not feel that the medication helped her relax one bit and she does not want to go in the MRI machine at all. rn stated she was attempting to get more meds, but we never got the additional meds within 1/2, will have to try mri late with additional meds.

## 2019-04-13 ENCOUNTER — ANESTHESIA EVENT (OUTPATIENT)
Dept: MRI IMAGING | Age: 74
DRG: 180 | End: 2019-04-13
Payer: COMMERCIAL

## 2019-04-13 ENCOUNTER — APPOINTMENT (OUTPATIENT)
Dept: MRI IMAGING | Age: 74
DRG: 180 | End: 2019-04-13
Payer: COMMERCIAL

## 2019-04-13 ENCOUNTER — ANESTHESIA (OUTPATIENT)
Dept: MRI IMAGING | Age: 74
DRG: 180 | End: 2019-04-13
Payer: COMMERCIAL

## 2019-04-13 VITALS
DIASTOLIC BLOOD PRESSURE: 62 MMHG | SYSTOLIC BLOOD PRESSURE: 94 MMHG | OXYGEN SATURATION: 99 % | RESPIRATION RATE: 12 BRPM

## 2019-04-13 LAB
ANION GAP SERPL CALCULATED.3IONS-SCNC: 14 MMOL/L (ref 7–16)
BUN BLDV-MCNC: 28 MG/DL (ref 8–23)
CALCIUM SERPL-MCNC: 9.7 MG/DL (ref 8.6–10.2)
CHLORIDE BLD-SCNC: 103 MMOL/L (ref 98–107)
CO2: 24 MMOL/L (ref 22–29)
CREAT SERPL-MCNC: 0.9 MG/DL (ref 0.5–1)
GFR AFRICAN AMERICAN: >60
GFR NON-AFRICAN AMERICAN: >60 ML/MIN/1.73
GLUCOSE BLD-MCNC: 169 MG/DL (ref 74–99)
HCT VFR BLD CALC: 44.4 % (ref 34–48)
HEMOGLOBIN: 14.9 G/DL (ref 11.5–15.5)
MCH RBC QN AUTO: 29.1 PG (ref 26–35)
MCHC RBC AUTO-ENTMCNC: 33.6 % (ref 32–34.5)
MCV RBC AUTO: 86.7 FL (ref 80–99.9)
PDW BLD-RTO: 15.3 FL (ref 11.5–15)
PLATELET # BLD: 280 E9/L (ref 130–450)
PMV BLD AUTO: 10.2 FL (ref 7–12)
POTASSIUM SERPL-SCNC: 4.3 MMOL/L (ref 3.5–5)
RBC # BLD: 5.12 E12/L (ref 3.5–5.5)
SODIUM BLD-SCNC: 141 MMOL/L (ref 132–146)
WBC # BLD: 22.1 E9/L (ref 4.5–11.5)

## 2019-04-13 PROCEDURE — A9579 GAD-BASE MR CONTRAST NOS,1ML: HCPCS | Performed by: RADIOLOGY

## 2019-04-13 PROCEDURE — 70553 MRI BRAIN STEM W/O & W/DYE: CPT

## 2019-04-13 PROCEDURE — 3700000000 HC ANESTHESIA ATTENDED CARE

## 2019-04-13 PROCEDURE — 2580000003 HC RX 258

## 2019-04-13 PROCEDURE — 85027 COMPLETE CBC AUTOMATED: CPT

## 2019-04-13 PROCEDURE — 6360000002 HC RX W HCPCS: Performed by: FAMILY MEDICINE

## 2019-04-13 PROCEDURE — 7100000000 HC PACU RECOVERY - FIRST 15 MIN

## 2019-04-13 PROCEDURE — 3700000001 HC ADD 15 MINUTES (ANESTHESIA)

## 2019-04-13 PROCEDURE — 6360000004 HC RX CONTRAST MEDICATION: Performed by: RADIOLOGY

## 2019-04-13 PROCEDURE — 36415 COLL VENOUS BLD VENIPUNCTURE: CPT

## 2019-04-13 PROCEDURE — 1200000000 HC SEMI PRIVATE

## 2019-04-13 PROCEDURE — 6360000002 HC RX W HCPCS

## 2019-04-13 PROCEDURE — 7100000001 HC PACU RECOVERY - ADDTL 15 MIN

## 2019-04-13 PROCEDURE — 2580000003 HC RX 258: Performed by: FAMILY MEDICINE

## 2019-04-13 PROCEDURE — 6370000000 HC RX 637 (ALT 250 FOR IP): Performed by: FAMILY MEDICINE

## 2019-04-13 PROCEDURE — 80048 BASIC METABOLIC PNL TOTAL CA: CPT

## 2019-04-13 PROCEDURE — 99232 SBSQ HOSP IP/OBS MODERATE 35: CPT | Performed by: PHYSICIAN ASSISTANT

## 2019-04-13 RX ORDER — MIDAZOLAM HYDROCHLORIDE 1 MG/ML
INJECTION INTRAMUSCULAR; INTRAVENOUS PRN
Status: DISCONTINUED | OUTPATIENT
Start: 2019-04-13 | End: 2019-04-13 | Stop reason: SDUPTHER

## 2019-04-13 RX ORDER — PROPOFOL 10 MG/ML
INJECTION, EMULSION INTRAVENOUS CONTINUOUS PRN
Status: DISCONTINUED | OUTPATIENT
Start: 2019-04-13 | End: 2019-04-13 | Stop reason: SDUPTHER

## 2019-04-13 RX ORDER — SODIUM CHLORIDE 9 MG/ML
INJECTION, SOLUTION INTRAVENOUS CONTINUOUS PRN
Status: DISCONTINUED | OUTPATIENT
Start: 2019-04-13 | End: 2019-04-13 | Stop reason: SDUPTHER

## 2019-04-13 RX ORDER — ONDANSETRON 2 MG/ML
4 INJECTION INTRAMUSCULAR; INTRAVENOUS
Status: ACTIVE | OUTPATIENT
Start: 2019-04-13 | End: 2019-04-13

## 2019-04-13 RX ADMIN — PROPOFOL 100 MCG/KG/MIN: 10 INJECTION, EMULSION INTRAVENOUS at 11:17

## 2019-04-13 RX ADMIN — SODIUM CHLORIDE: 9 INJECTION, SOLUTION INTRAVENOUS at 11:11

## 2019-04-13 RX ADMIN — DEXAMETHASONE SODIUM PHOSPHATE 4 MG: 4 INJECTION, SOLUTION INTRAMUSCULAR; INTRAVENOUS at 18:16

## 2019-04-13 RX ADMIN — Medication 10 ML: at 10:04

## 2019-04-13 RX ADMIN — DEXAMETHASONE SODIUM PHOSPHATE 4 MG: 4 INJECTION, SOLUTION INTRAMUSCULAR; INTRAVENOUS at 22:58

## 2019-04-13 RX ADMIN — DEXAMETHASONE SODIUM PHOSPHATE 4 MG: 4 INJECTION, SOLUTION INTRAMUSCULAR; INTRAVENOUS at 04:46

## 2019-04-13 RX ADMIN — ACETAMINOPHEN 650 MG: 325 TABLET, FILM COATED ORAL at 13:05

## 2019-04-13 RX ADMIN — Medication 10 ML: at 21:14

## 2019-04-13 RX ADMIN — TRIAMTERENE AND HYDROCHLOROTHIAZIDE 1 TABLET: 37.5; 25 TABLET ORAL at 10:03

## 2019-04-13 RX ADMIN — MIDAZOLAM HYDROCHLORIDE 2 MG: 1 INJECTION, SOLUTION INTRAMUSCULAR; INTRAVENOUS at 11:12

## 2019-04-13 RX ADMIN — MIRTAZAPINE 30 MG: 15 TABLET, FILM COATED ORAL at 21:14

## 2019-04-13 RX ADMIN — DEXAMETHASONE SODIUM PHOSPHATE 4 MG: 4 INJECTION, SOLUTION INTRAMUSCULAR; INTRAVENOUS at 13:07

## 2019-04-13 RX ADMIN — GADOTERIDOL 14 ML: 279.3 INJECTION, SOLUTION INTRAVENOUS at 14:47

## 2019-04-13 ASSESSMENT — PAIN SCALES - GENERAL
PAINLEVEL_OUTOF10: 4
PAINLEVEL_OUTOF10: 0

## 2019-04-13 ASSESSMENT — PAIN DESCRIPTION - PROGRESSION: CLINICAL_PROGRESSION: NOT CHANGED

## 2019-04-13 ASSESSMENT — PAIN - FUNCTIONAL ASSESSMENT: PAIN_FUNCTIONAL_ASSESSMENT: PREVENTS OR INTERFERES SOME ACTIVE ACTIVITIES AND ADLS

## 2019-04-13 ASSESSMENT — PAIN DESCRIPTION - PAIN TYPE: TYPE: ACUTE PAIN

## 2019-04-13 ASSESSMENT — PAIN DESCRIPTION - LOCATION: LOCATION: HEAD

## 2019-04-13 ASSESSMENT — PAIN DESCRIPTION - DESCRIPTORS: DESCRIPTORS: ACHING

## 2019-04-13 ASSESSMENT — PAIN DESCRIPTION - ONSET: ONSET: ON-GOING

## 2019-04-13 ASSESSMENT — PAIN DESCRIPTION - FREQUENCY: FREQUENCY: INTERMITTENT

## 2019-04-13 NOTE — ANESTHESIA PRE PROCEDURE
Department of Anesthesiology  Preprocedure Note       Name:  Elder Gorman   Age:  74 y.o.  :  1945                                          MRN:  [id number]         Date:  2019      Surgeon: * No surgeons listed *    Procedure: MRI BRAIN W WO CONTRAST    Medications prior to admission:   Prior to Admission medications    Medication Sig Start Date End Date Taking?  Authorizing Provider   triamterene-hydrochlorothiazide (MAXZIDE-25) 37.5-25 MG per tablet Take 1 tablet by mouth daily 3/19/19  Yes Historical Provider, MD   HYDROcodone-acetaminophen ([address])  MG per tablet TAKE 1 TABLET BY MOUTH EVERY 4 HOURS AS NEEDED FOR PAIN 3/19/19  Yes Historical Provider, MD   mirtazapine (REMERON) 30 MG tablet Take 30 mg by mouth nightly   Yes Historical Provider, MD   levothyroxine (SYNTHROID) 25 MCG tablet Take 25 mcg by mouth daily 3/6/19   Historical Provider, MD       Current medications:    Current Facility-Administered Medications   Medication Dose Route Frequency Provider Last Rate Last Dose    calcium carbonate (TUMS) chewable tablet 1,000 mg  1,000 mg Oral TID PRN Glenna King MD   1,000 mg at 19 0112    sodium chloride flush 0.9 % injection 10 mL  10 mL Intravenous BID Glenna King MD   10 mL at 19 1004    LORazepam (ATIVAN) injection 2 mg  2 mg Intravenous Once Glenna King MD        acetaminophen (TYLENOL) tablet 650 mg  650 mg Oral Q6H PRN Glenna King MD        HY[place]aminophen [facility] [place]  MG per tablet 1 tablet  1 tablet Oral Q4H PRN Glenna King MD   1 tablet at 19 204    mirtazapine (REMERON) tablet 30 mg  30 mg Oral Nightly Glenna King MD   30 mg at 19    triamterene-hydrochlorothiazide (MAXZIDE-25) 37.5-25 MG per tablet 1 tablet  1 tablet Oral Daily Glenna King MD   1 tablet at 19 1003    dexamethasone (DECADRON) injection 4 mg  4 mg Intravenous Q6H Samuel Muse Isabel Baer MD   4 mg at 19 0446    enoxaparin (LOVENOX) injection 30 mg  30 mg Subcutaneous Daily Meka Gasca MD           Allergies:  No Known Allergies    Problem List:    Patient Active Problem List   Diagnosis Code    Warthin's tumor D11.9    [facility] C80.1    Abscess L02.91    Metastatic disease ([facility] C79.9    Brain mass G93.9       Past Medical History:        Diagnosis Date    Chronic back pain     Chronic bronchitis ([facility]     doing well as of 10/6/2017    Hypertension     Mass of parotid gland     right    PONV (postoperative nausea and vomiting)     Wears dentures     full upper/partial bottom       Past Surgical History:        Procedure Laterality Date    ABSCESS DRAINAGE      several / breast    CHOLECYSTECTOMY      open    HYSTERECTOMY      OTHER SURGICAL HISTORY Left 2017    superficial parotoidectomy(cyst removal)    PAROTIDECTOMY Right     superficial parotidectomy    WRIST SURGERY Left     orif       Social History:    Social History     Tobacco Use    Smoking status: Former Smoker     Packs/day: 1.00     Years: 50.00     Pack years: 50.00     Last attempt to quit: 2018     Years since quittin.1    Smokeless tobacco: Never Used   Substance Use Topics    Alcohol use:  No                                Counseling given: Not Answered      Vital Signs (Current):   Vitals:    19 0815 19 1515 19 2330 19 0845   BP: 136/74 123/83 102/73 103/61   Pulse: 98 109 104 91   Resp: 16 16 16 16   Temp: 36.3 °C (97.3 °F) 36.9 °C (98.5 °F) 36.2 °C (97.1 °F) 36.3 °C (97.3 °F)   TempSrc: Temporal Temporal Temporal Temporal   SpO2: 95% 96% 95% 96%   Weight:       Height:                                                  BP Readings from Last 3 Encounters:   19 103/61   10/02/18 100/70   18 120/77       NPO Status:  > 11 HOURS                                                                                BMI:   Wt Anesthesia Plan      MAC     ASA 3       Induction: intravenous. Anesthetic plan and risks discussed with patient. Plan discussed with attending. Juan Ray, APRN - CRNA   4/13/2019      DOS STAFF ADDENDUM:    Pt seen and examined, physical exam updated, chart reviewed including anesthesia, drug and allergy history. H&P reviewed. No interval changes to history or physical examination (unless noted above). NPO status confirmed. Anesthetic plan, risks, benefits, alternatives discussed with patient. Patient verbalized an understanding and agrees to proceed.      Kory Jackson MD  Staff Anesthesiologist

## 2019-04-13 NOTE — PLAN OF CARE
Problem: Falls - Risk of:  Goal: Will remain free from falls  Description  Will remain free from falls  Outcome: Met This Shift     Problem: Falls - Risk of:  Goal: Absence of physical injury  Description  Absence of physical injury  Outcome: Met This Shift     Problem: Pain:  Goal: Pain level will decrease  Description  Pain level will decrease  Outcome: Met This Shift

## 2019-04-13 NOTE — PROGRESS NOTES
Department of Neurosurgery  Progress Note    CHIEF COMPLAINT: brain lesion     SUBJECTIVE:  C/o HA. Plan for MRI brain. No new issues overnight. REVIEW OF SYSTEMS :  Constitutional: Negative for chills and fever. Neurological: Negative for dizziness, tremors and speech change. OBJECTIVE:   VITALS:  /61   Pulse 91   Temp 97.3 °F (36.3 °C) (Temporal)   Resp 16   Ht 5' 5.5\" (1.664 m)   Wt 140 lb (63.5 kg)   SpO2 96%   BMI 22.94 kg/m²     PHYSICAL:  Constitutional: She appears well-developed and well-nourished. HENT:   Head: Normocephalic. Eyes: Pupils are equal, round, and reactive to light. Conjunctivae and EOM are normal.   Visual fields full to gross examination. Neck: Normal range of motion. No tracheal deviation present. Cardiovascular: Normal rate. Pulmonary/Chest: Effort normal.   Abdominal: She exhibits no distension. Musculoskeletal: Normal range of motion. Neurological:    Alert to self and place, not to year   Face symmetric    Motor strength symmetric and full   Sensation intact to light touch    Skin: Skin is warm and dry.    Psychiatric: Thought content normal.         DATA:  CBC:   Lab Results   Component Value Date    WBC 22.1 04/13/2019    RBC 5.12 04/13/2019    HGB 14.9 04/13/2019    HCT 44.4 04/13/2019    MCV 86.7 04/13/2019    MCH 29.1 04/13/2019    MCHC 33.6 04/13/2019    RDW 15.3 04/13/2019     04/13/2019    MPV 10.2 04/13/2019     BMP:    Lab Results   Component Value Date     04/13/2019    K 4.3 04/13/2019     04/13/2019    CO2 24 04/13/2019    BUN 28 04/13/2019    LABALBU 4.6 04/11/2019    CREATININE 0.9 04/13/2019    CALCIUM 9.7 04/13/2019    GFRAA >60 04/13/2019    LABGLOM >60 04/13/2019    GLUCOSE 169 04/13/2019     PT/INR:  No results found for: PROTIME, INR  PTT:  No results found for: APTT, PTT[APTT}    Current Inpatient Medications  Current Facility-Administered Medications: calcium carbonate (TUMS) chewable tablet 1,000 mg, 1,000

## 2019-04-13 NOTE — ANESTHESIA POSTPROCEDURE EVALUATION
Department of Anesthesiology  Postprocedure Note    Patient: Breanne Evangelista  MRN: [id number]  YOB: 1945  Date of evaluation: 4/13/2019  Time:  12:37 PM     Procedure Summary     Date:  04/13/19 Room / Location:  [facility]    Anesthesia Start:  1111 Anesthesia Stop:  1205    Procedure:  MRI BRAIN W WO CONTRAST Diagnosis:      Scheduled Providers:   Responsible Provider:  Charles Watkins MD    Anesthesia Type:  MAC ASA Status:  3          Anesthesia Type: MAC    Glynn Phase I: Glynn Score: 10    Glynn Phase II:      Last vitals: Reviewed and per EMR flowsheets.        Anesthesia Post Evaluation    Patient location during evaluation: PACU  Patient participation: complete - patient participated  Level of consciousness: awake  Pain score: 0  Airway patency: patent  Nausea & Vomiting: no vomiting  Complications: no  Cardiovascular status: hemodynamically stable  Respiratory status: spontaneous ventilation  Hydration status: stable

## 2019-04-13 NOTE — PROGRESS NOTES
Wade Ham is a 74 y.o. female patient. Patient says she feels fair at this time. Current Facility-Administered Medications   Medication Dose Route Frequency Provider Last Rate Last Dose    calcium carbonate (TUMS) chewable tablet 1,000 mg  1,000 mg Oral TID PRN Estevan Dorsey MD   1,000 mg at 04/12/19 0112    sodium chloride flush 0.9 % injection 10 mL  10 mL Intravenous BID Estevan Dorsey MD   10 mL at 04/12/19 2042    LORazepam (ATIVAN) injection 2 mg  2 mg Intravenous Once Estevan Dorsey MD        acetaminophen (TYLENOL) tablet 650 mg  650 mg Oral Q6H PRN Estevan Dorsey MD        HYDROcodone-acetaminophen [facility])  MG per tablet 1 tablet  1 tablet Oral Q4H PRN Estevan Dorsey MD   1 tablet at 04/12/19 2042    mirtazapine (REMERON) tablet 30 mg  30 mg Oral Nightly Estevan Dorsey MD   30 mg at 04/12/19 2042    triamterene-hydrochlorothiazide (MAXZIDE-25) 37.5-25 MG per tablet 1 tablet  1 tablet Oral Daily Estevan Dorsey MD   1 tablet at 04/12/19 0915    dexamethasone (DECADRON) injection 4 mg  4 mg Intravenous Q6H Estevan Dorsey MD   4 mg at 04/13/19 0446    enoxaparin (LOVENOX) injection 30 mg  30 mg Subcutaneous Daily Estevan Dorsey MD         No Known Allergies  Active Problems:    Metastatic disease ([address]    Brain mass  Resolved Problems:    * No resolved hospital problems. *    Blood pressure 102/73, pulse 104, temperature 97.1 °F (36.2 °C), temperature source Temporal, resp. rate 16, height 5' 5.5\" (1.664 m), weight 140 lb (63.5 kg), SpO2 95 %, not currently breastfeeding. Subjective:  Symptoms:  Stable. Diet:  Adequate intake. Activity level: Normal.    Pain:  She complains of pain that is mild. Objective:  General Appearance:  Comfortable. Vital signs: (most recent): Blood pressure 102/73, pulse 104, temperature 97.1 °F (36.2 °C), temperature source Temporal, resp.  rate 16, height 5' 5.5\" (1.664

## 2019-04-14 LAB
ANION GAP SERPL CALCULATED.3IONS-SCNC: 13 MMOL/L (ref 7–16)
BUN BLDV-MCNC: 41 MG/DL (ref 8–23)
CALCIUM SERPL-MCNC: 9.2 MG/DL (ref 8.6–10.2)
CHLORIDE BLD-SCNC: 102 MMOL/L (ref 98–107)
CO2: 24 MMOL/L (ref 22–29)
CREAT SERPL-MCNC: 1.1 MG/DL (ref 0.5–1)
GFR AFRICAN AMERICAN: 59
GFR NON-AFRICAN AMERICAN: 59 ML/MIN/1.73
GLUCOSE BLD-MCNC: 178 MG/DL (ref 74–99)
HCT VFR BLD CALC: 41.7 % (ref 34–48)
HEMOGLOBIN: 13.8 G/DL (ref 11.5–15.5)
MCH RBC QN AUTO: 28.9 PG (ref 26–35)
MCHC RBC AUTO-ENTMCNC: 33.1 % (ref 32–34.5)
MCV RBC AUTO: 87.2 FL (ref 80–99.9)
PDW BLD-RTO: 15.3 FL (ref 11.5–15)
PLATELET # BLD: 276 E9/L (ref 130–450)
PMV BLD AUTO: 10.3 FL (ref 7–12)
POTASSIUM SERPL-SCNC: 4.6 MMOL/L (ref 3.5–5)
RBC # BLD: 4.78 E12/L (ref 3.5–5.5)
SODIUM BLD-SCNC: 139 MMOL/L (ref 132–146)
WBC # BLD: 20 E9/L (ref 4.5–11.5)

## 2019-04-14 PROCEDURE — 6370000000 HC RX 637 (ALT 250 FOR IP): Performed by: FAMILY MEDICINE

## 2019-04-14 PROCEDURE — 1200000000 HC SEMI PRIVATE

## 2019-04-14 PROCEDURE — 80048 BASIC METABOLIC PNL TOTAL CA: CPT

## 2019-04-14 PROCEDURE — 85027 COMPLETE CBC AUTOMATED: CPT

## 2019-04-14 PROCEDURE — 6360000002 HC RX W HCPCS: Performed by: FAMILY MEDICINE

## 2019-04-14 PROCEDURE — 36415 COLL VENOUS BLD VENIPUNCTURE: CPT

## 2019-04-14 PROCEDURE — 99232 SBSQ HOSP IP/OBS MODERATE 35: CPT | Performed by: NEUROLOGICAL SURGERY

## 2019-04-14 PROCEDURE — 2580000003 HC RX 258: Performed by: FAMILY MEDICINE

## 2019-04-14 RX ADMIN — DEXAMETHASONE SODIUM PHOSPHATE 4 MG: 4 INJECTION, SOLUTION INTRAMUSCULAR; INTRAVENOUS at 13:21

## 2019-04-14 RX ADMIN — Medication 10 ML: at 09:16

## 2019-04-14 RX ADMIN — DEXAMETHASONE SODIUM PHOSPHATE 4 MG: 4 INJECTION, SOLUTION INTRAMUSCULAR; INTRAVENOUS at 22:32

## 2019-04-14 RX ADMIN — ACETAMINOPHEN 650 MG: 325 TABLET, FILM COATED ORAL at 18:28

## 2019-04-14 RX ADMIN — DEXAMETHASONE SODIUM PHOSPHATE 4 MG: 4 INJECTION, SOLUTION INTRAMUSCULAR; INTRAVENOUS at 05:13

## 2019-04-14 RX ADMIN — TRIAMTERENE AND HYDROCHLOROTHIAZIDE 1 TABLET: 37.5; 25 TABLET ORAL at 09:14

## 2019-04-14 RX ADMIN — DEXAMETHASONE SODIUM PHOSPHATE 4 MG: 4 INJECTION, SOLUTION INTRAMUSCULAR; INTRAVENOUS at 18:28

## 2019-04-14 RX ADMIN — MIRTAZAPINE 30 MG: 15 TABLET, FILM COATED ORAL at 21:19

## 2019-04-14 RX ADMIN — Medication 10 ML: at 22:32

## 2019-04-14 RX ADMIN — ACETAMINOPHEN 650 MG: 325 TABLET, FILM COATED ORAL at 13:21

## 2019-04-14 ASSESSMENT — PAIN - FUNCTIONAL ASSESSMENT: PAIN_FUNCTIONAL_ASSESSMENT: PREVENTS OR INTERFERES SOME ACTIVE ACTIVITIES AND ADLS

## 2019-04-14 ASSESSMENT — PAIN DESCRIPTION - PAIN TYPE
TYPE: ACUTE PAIN
TYPE: ACUTE PAIN

## 2019-04-14 ASSESSMENT — PAIN DESCRIPTION - LOCATION
LOCATION: HAND
LOCATION: HEAD

## 2019-04-14 ASSESSMENT — PAIN SCALES - GENERAL
PAINLEVEL_OUTOF10: 4
PAINLEVEL_OUTOF10: 5
PAINLEVEL_OUTOF10: 0

## 2019-04-14 ASSESSMENT — PAIN DESCRIPTION - PROGRESSION
CLINICAL_PROGRESSION: NOT CHANGED
CLINICAL_PROGRESSION: NOT CHANGED

## 2019-04-14 ASSESSMENT — PAIN DESCRIPTION - DESCRIPTORS
DESCRIPTORS: ACHING
DESCRIPTORS: ACHING

## 2019-04-14 ASSESSMENT — PAIN DESCRIPTION - FREQUENCY
FREQUENCY: INTERMITTENT
FREQUENCY: INTERMITTENT

## 2019-04-14 NOTE — PROGRESS NOTES
Bessie Hutchinson is a 74 y.o. female patient. Patient says she feels fair at this time. Current Facility-Administered Medications   Medication Dose Route Frequency Provider Last Rate Last Dose    HYDROmorphone (DILAUDID) injection 0.25 mg  0.25 mg Intravenous Q5 Min PRN Esthela Severino MD        calcium carbonate (TUMS) chewable tablet 1,000 mg  1,000 mg Oral TID PRN Meka Gasca MD   1,000 mg at 04/12/19 0112    sodium chloride flush 0.9 % injection 10 mL  10 mL Intravenous BID Meka Gasca MD   10 mL at 04/13/19 2114    LORazepam (ATIVAN) injection 2 mg  2 mg Intravenous Once Meka Gasca MD        acetaminophen (TYLENOL) tablet 650 mg  650 mg Oral Q6H PRN Meka Gasca MD   650 mg at 04/13/19 1305    HYDROcodone-acetaminophen (NORCO)  MG per tablet 1 tablet  1 tablet Oral Q4H PRN Meka Gasca MD   1 tablet at 04/12/19 2042    mirtazapine (REMERON) tablet 30 mg  30 mg Oral Nightly Meka Gasca MD   30 mg at 04/13/19 2114    triamterene-hydrochlorothiazide (MAXZIDE-25) 37.5-25 MG per tablet 1 tablet  1 tablet Oral Daily Meka Gasca MD   1 tablet at 04/13/19 1003    dexamethasone (DECADRON) injection 4 mg  4 mg Intravenous Q6H Meka Gasca MD   4 mg at 04/14/19 0513    enoxaparin (LOVENOX) injection 30 mg  30 mg Subcutaneous Daily Meka Gasca MD         No Known Allergies  Active Problems:    Metastatic disease ([facility]    Brain mass  Resolved Problems:    * No resolved hospital problems. *    Blood pressure 119/72, pulse 92, temperature 97.5 °F (36.4 °C), temperature source Temporal, resp. rate 17, height 5' 5.5\" (1.664 m), weight 140 lb (63.5 kg), SpO2 96 %, not currently breastfeeding. Subjective:  Symptoms:  Stable. Diet:  Adequate intake. Activity level: Normal.    Pain:  She complains of pain that is mild. Objective:  General Appearance:  Comfortable.     Vital signs: (most recent): Blood pressure 119/72, pulse 92, temperature 97.5 °F (36.4 °C), temperature source Temporal, resp. rate 17, height 5' 5.5\" (1.664 m), weight 140 lb (63.5 kg), SpO2 96 %, not currently breastfeeding. No fever. Lungs:  Normal effort and normal respiratory rate. Breath sounds clear to auscultation. Heart: Normal rate. Regular rhythm. S1 normal and S2 normal.    Abdomen: Abdomen is soft. There is no abdominal tenderness. Assessment:  (Brain mass  Lung cancer  HTN    ). Plan:   (MRI brain reviewed as possible primary vs met lesion  Await neurosurgery and radiation oncology plans. ).        Gloria Calvillo MD  4/14/2019

## 2019-04-14 NOTE — PROGRESS NOTES
Department of Neurosurgery  Progress Note    CHIEF COMPLAINT: brain lesion     SUBJECTIVE:  Awake and alert. Denies new complaints. REVIEW OF SYSTEMS :  Constitutional: Negative for chills and fever. Neurological: Negative for dizziness, tremors and speech change. OBJECTIVE:   VITALS:  BP (!) 153/76   Pulse 87   Temp 97.7 °F (36.5 °C) (Temporal)   Resp 16   Ht 5' 5.5\" (1.664 m)   Wt 140 lb (63.5 kg)   SpO2 100%   BMI 22.94 kg/m²     PHYSICAL:  Constitutional: She appears well-developed and well-nourished. HENT:   Head: Normocephalic. Eyes: Pupils are equal, round, and reactive to light. Conjunctivae and EOM are normal.   Visual fields full to gross examination. Neck: Normal range of motion. No tracheal deviation present. Cardiovascular: Normal rate. Pulmonary/Chest: Effort normal.   Abdominal: She exhibits no distension. Musculoskeletal: Normal range of motion. Neurological:    Alert to self and place, not to year   Face symmetric    Motor strength symmetric and full   Sensation intact to light touch    Skin: Skin is warm and dry.    Psychiatric: Thought content normal.         DATA:  CBC:   Lab Results   Component Value Date    WBC 20.0 04/14/2019    RBC 4.78 04/14/2019    HGB 13.8 04/14/2019    HCT 41.7 04/14/2019    MCV 87.2 04/14/2019    MCH 28.9 04/14/2019    MCHC 33.1 04/14/2019    RDW 15.3 04/14/2019     04/14/2019    MPV 10.3 04/14/2019     BMP:    Lab Results   Component Value Date     04/14/2019    K 4.6 04/14/2019     04/14/2019    CO2 24 04/14/2019    BUN 41 04/14/2019    LABALBU 4.6 04/11/2019    CREATININE 1.1 04/14/2019    CALCIUM 9.2 04/14/2019    GFRAA 59 04/14/2019    LABGLOM 59 04/14/2019    GLUCOSE 178 04/14/2019     PT/INR:  No results found for: PROTIME, INR  PTT:  No results found for: APTT, PTT[APTT}    Current Inpatient Medications  Current Facility-Administered Medications: HYDROmorphone (DILAUDID) injection 0.25 mg, 0.25 mg, Intravenous, Q5 Min PRN  calcium carbonate (TUMS) chewable tablet 1,000 mg, 1,000 mg, Oral, TID PRN  sodium chloride flush 0.9 % injection 10 mL, 10 mL, Intravenous, BID  LORazepam (ATIVAN) injection 2 mg, 2 mg, Intravenous, Once  acetaminophen (TYLENOL) tablet 650 mg, 650 mg, Oral, Q6H PRN  HYDROcodone-acetaminophen (NORCO)  MG per tablet 1 tablet, 1 tablet, Oral, Q4H PRN  mirtazapine (REMERON) tablet 30 mg, 30 mg, Oral, Nightly  triamterene-hydrochlorothiazide (MAXZIDE-25) 37.5-25 MG per tablet 1 tablet, 1 tablet, Oral, Daily  dexamethasone (DECADRON) injection 4 mg, 4 mg, Intravenous, Q6H  enoxaparin (LOVENOX) injection 30 mg, 30 mg, Subcutaneous, Daily    ASSESSMENT:   New problem: new left parietal-occipital brain lesion, probable metastatic disease  Hx of stage 3 adenocarcinoma of lung      Vinicius Condon is 74years old. She has history of stage III adenocarcinoma of the lung. She had final needle aspiration of the left upper lobe mass on May 17, 2018. She has been receiving chemotherapy and has also received radiation. She also has hx history of right parotidectomy, hysterectomy, and cholecystectomy. She previously smoked.     4/11 HCT:  mass of the left posterior parietal occipital junction 2.6 cm, surrounding vasogenic edema    4/13 MRI brain: solitary 2.6 cm mass along occipital horn of left lateral ventricle. No other lesions noted. PLAN:  -Oncology planning radiation for brain lesion.        Electronically signed by Ngozi Seals PA-C on 4/14/2019 at 12:05 PM      Agree with plan.  Electronically signed by Chanelle Rajan MD on 4/14/2019 at 7:03 PM

## 2019-04-15 LAB
ANION GAP SERPL CALCULATED.3IONS-SCNC: 14 MMOL/L (ref 7–16)
BUN BLDV-MCNC: 36 MG/DL (ref 8–23)
CALCIUM SERPL-MCNC: 9.2 MG/DL (ref 8.6–10.2)
CHLORIDE BLD-SCNC: 102 MMOL/L (ref 98–107)
CO2: 22 MMOL/L (ref 22–29)
CREAT SERPL-MCNC: 0.9 MG/DL (ref 0.5–1)
GFR AFRICAN AMERICAN: >60
GFR NON-AFRICAN AMERICAN: >60 ML/MIN/1.73
GLUCOSE BLD-MCNC: 240 MG/DL (ref 74–99)
HCT VFR BLD CALC: 43.3 % (ref 34–48)
HEMOGLOBIN: 14.5 G/DL (ref 11.5–15.5)
MCH RBC QN AUTO: 29.1 PG (ref 26–35)
MCHC RBC AUTO-ENTMCNC: 33.5 % (ref 32–34.5)
MCV RBC AUTO: 86.9 FL (ref 80–99.9)
PDW BLD-RTO: 15.4 FL (ref 11.5–15)
PLATELET # BLD: 270 E9/L (ref 130–450)
PMV BLD AUTO: 10.2 FL (ref 7–12)
POTASSIUM SERPL-SCNC: 4.1 MMOL/L (ref 3.5–5)
RBC # BLD: 4.98 E12/L (ref 3.5–5.5)
SODIUM BLD-SCNC: 138 MMOL/L (ref 132–146)
WBC # BLD: 19.8 E9/L (ref 4.5–11.5)

## 2019-04-15 PROCEDURE — 1200000000 HC SEMI PRIVATE

## 2019-04-15 PROCEDURE — 6360000002 HC RX W HCPCS: Performed by: FAMILY MEDICINE

## 2019-04-15 PROCEDURE — 85027 COMPLETE CBC AUTOMATED: CPT

## 2019-04-15 PROCEDURE — 6370000000 HC RX 637 (ALT 250 FOR IP): Performed by: FAMILY MEDICINE

## 2019-04-15 PROCEDURE — 99232 SBSQ HOSP IP/OBS MODERATE 35: CPT | Performed by: NEUROLOGICAL SURGERY

## 2019-04-15 PROCEDURE — 2580000003 HC RX 258: Performed by: FAMILY MEDICINE

## 2019-04-15 PROCEDURE — 80048 BASIC METABOLIC PNL TOTAL CA: CPT

## 2019-04-15 PROCEDURE — 36415 COLL VENOUS BLD VENIPUNCTURE: CPT

## 2019-04-15 RX ORDER — SODIUM CHLORIDE 0.9 % (FLUSH) 0.9 %
10 SYRINGE (ML) INJECTION PRN
Status: DISCONTINUED | OUTPATIENT
Start: 2019-04-15 | End: 2019-04-17 | Stop reason: HOSPADM

## 2019-04-15 RX ADMIN — Medication 10 ML: at 17:40

## 2019-04-15 RX ADMIN — TRIAMTERENE AND HYDROCHLOROTHIAZIDE 1 TABLET: 37.5; 25 TABLET ORAL at 09:09

## 2019-04-15 RX ADMIN — DEXAMETHASONE SODIUM PHOSPHATE 4 MG: 4 INJECTION, SOLUTION INTRAMUSCULAR; INTRAVENOUS at 17:38

## 2019-04-15 RX ADMIN — Medication 10 ML: at 09:09

## 2019-04-15 RX ADMIN — DEXAMETHASONE SODIUM PHOSPHATE 4 MG: 4 INJECTION, SOLUTION INTRAMUSCULAR; INTRAVENOUS at 12:06

## 2019-04-15 RX ADMIN — DEXAMETHASONE SODIUM PHOSPHATE 4 MG: 4 INJECTION, SOLUTION INTRAMUSCULAR; INTRAVENOUS at 23:32

## 2019-04-15 RX ADMIN — DEXAMETHASONE SODIUM PHOSPHATE 4 MG: 4 INJECTION, SOLUTION INTRAMUSCULAR; INTRAVENOUS at 05:19

## 2019-04-15 RX ADMIN — ENOXAPARIN SODIUM 30 MG: 30 INJECTION SUBCUTANEOUS at 09:09

## 2019-04-15 RX ADMIN — ACETAMINOPHEN 650 MG: 325 TABLET, FILM COATED ORAL at 14:02

## 2019-04-15 RX ADMIN — ACETAMINOPHEN 650 MG: 325 TABLET, FILM COATED ORAL at 06:05

## 2019-04-15 RX ADMIN — MIRTAZAPINE 30 MG: 15 TABLET, FILM COATED ORAL at 21:22

## 2019-04-15 ASSESSMENT — PAIN SCALES - GENERAL
PAINLEVEL_OUTOF10: 0
PAINLEVEL_OUTOF10: 0
PAINLEVEL_OUTOF10: 2
PAINLEVEL_OUTOF10: 3
PAINLEVEL_OUTOF10: 4
PAINLEVEL_OUTOF10: 0

## 2019-04-15 ASSESSMENT — PAIN DESCRIPTION - ONSET: ONSET: ON-GOING

## 2019-04-15 ASSESSMENT — PAIN DESCRIPTION - PAIN TYPE
TYPE: ACUTE PAIN

## 2019-04-15 ASSESSMENT — PAIN DESCRIPTION - FREQUENCY: FREQUENCY: CONTINUOUS

## 2019-04-15 ASSESSMENT — PAIN DESCRIPTION - LOCATION
LOCATION: HEAD

## 2019-04-15 ASSESSMENT — PAIN DESCRIPTION - PROGRESSION: CLINICAL_PROGRESSION: NOT CHANGED

## 2019-04-15 ASSESSMENT — PAIN DESCRIPTION - DESCRIPTORS
DESCRIPTORS: ACHING;DISCOMFORT;HEADACHE
DESCRIPTORS: ACHING;DISCOMFORT;HEADACHE
DESCRIPTORS: HEADACHE

## 2019-04-15 NOTE — PROGRESS NOTES
Brannon Turner is a 74 y.o. female patient. Patient says she feels fair at this time. Current Facility-Administered Medications   Medication Dose Route Frequency Provider Last Rate Last Dose    HYDROmorphone (DILAUDID) injection 0.25 mg  0.25 mg Intravenous Q5 Min PRN Nitza Spence MD        calcium carbonate (TUMS) chewable tablet 1,000 mg  1,000 mg Oral TID PRN Cora Bull MD   1,000 mg at 04/12/19 0112    sodium chloride flush 0.9 % injection 10 mL  10 mL Intravenous BID Cora Bull MD   10 mL at 04/14/19 2232    LORazepam (ATIVAN) injection 2 mg  2 mg Intravenous Once Cora Bull MD        acetaminophen (TYLENOL) tablet 650 mg  650 mg Oral Q6H PRN Cora Bull MD   650 mg at 04/15/19 0605    HYDROcodone-acetaminophen (NORCO)  MG per tablet 1 tablet  1 tablet Oral Q4H PRN Cora Bull MD   1 tablet at 04/12/19 2042    mirtazapine (REMERON) tablet 30 mg  30 mg Oral Nightly Cora Bull MD   30 mg at 04/14/19 2119    triamterene-hydrochlorothiazide (MAXZIDE-25) 37.5-25 MG per tablet 1 tablet  1 tablet Oral Daily Cora Bull MD   1 tablet at 04/14/19 0914    dexamethasone (DECADRON) injection 4 mg  4 mg Intravenous Q6H Cora Bull MD   4 mg at 04/15/19 0519    enoxaparin (LOVENOX) injection 30 mg  30 mg Subcutaneous Daily Cora Bull MD         No Known Allergies  Active Problems:    Metastatic disease ([facility]    Brain mass  Resolved Problems:    * No resolved hospital problems. *    Blood pressure 131/75, pulse 89, temperature 98.9 °F (37.2 °C), temperature source Temporal, resp. rate 17, height 5' 5.5\" (1.664 m), weight 140 lb (63.5 kg), SpO2 98 %, not currently breastfeeding. Subjective:  Symptoms:  Stable. Diet:  Adequate intake. Activity level: Normal.    Pain:  She complains of pain that is mild. Objective:  General Appearance:  Comfortable.     Vital signs: (most recent): Blood pressure 131/75, pulse 89, temperature 98.9 °F (37.2 °C), temperature source Temporal, resp. rate 17, height 5' 5.5\" (1.664 m), weight 140 lb (63.5 kg), SpO2 98 %, not currently breastfeeding. No fever. Lungs:  Normal effort and normal respiratory rate. Breath sounds clear to auscultation. Heart: Normal rate. Regular rhythm. S1 normal and S2 normal.    Abdomen: Abdomen is soft. There is no abdominal tenderness. Assessment:  (Brain mass  Lung cancer  HTN    ). Plan:   (MRI brain reviewed as possible primary vs met lesion  Await neurosurgery and radiation oncology plans. ).        Charo Fenton MD  4/15/2019

## 2019-04-15 NOTE — CARE COORDINATION
4/15/2019 social work transition of care  Sw followed up with pt and grand daughter at bedside. Pt plan remains for home. Pt stated that she may need a hospital bed,due to being fearful of the steps in her home (pt reported that she has a bsc at home. Sw provided dme,home care and private duty lists. Sw will follow up for choices for hhc and dme provider.    Electronically signed by EMMY Miller on 4/15/2019 at 11:41 AM

## 2019-04-15 NOTE — CARE COORDINATION
4/15/2019 social work transition of care  Sw followed up wit hpt and grand daughter about hhc and dme choices. [facility] was chose-referral made and pt accepted. Pt and family have no preference on dme providers. Sw faxed order to [facility], order is still in process, will not be ready today. Tiffanie will follow and assist prn.   Electronically signed by EMMY Khan on 4/15/2019 at 4:25 PM

## 2019-04-15 NOTE — PROGRESS NOTES
injection 30 mg Daily     Todays labs:  Recent Labs     04/13/19  0459 04/14/19  0502 04/15/19  0545   WBC 22.1* 20.0* 19.8*   HGB 14.9 13.8 14.5    276 270   BUN 28* 41* 36*   CREATININE 0.9 1.1* 0.9                                                     Objective:     Patient Vitals for the past 8 hrs:   BP Temp Temp src Pulse Resp   04/15/19 0900 116/70 98 °F (36.7 °C) Temporal 84 16             Impression:     Active Problems:    Metastatic disease (HCC)    Brain mass  Resolved Problems:    * No resolved hospital problems.  *          Plan:     Plans for SRS brain lesion will continue with Decadron

## 2019-04-15 NOTE — PROGRESS NOTES
Department of Neurosurgery  Progress Note    CHIEF COMPLAINT: brain lesion     SUBJECTIVE:  Awake. Feels ok. No new issues overnight. REVIEW OF SYSTEMS :  Constitutional: Negative for chills and fever. Neurological: Negative for dizziness, tremors and speech change. OBJECTIVE:   VITALS:  /75   Pulse 89   Temp 98.9 °F (37.2 °C) (Temporal)   Resp 17   Ht 5' 5.5\" (1.664 m)   Wt 140 lb (63.5 kg)   SpO2 98%   BMI 22.94 kg/m²     PHYSICAL:  Constitutional: She appears well-developed and well-nourished. HENT:   Head: Normocephalic. Eyes: Pupils are equal, round, and reactive to light. Conjunctivae and EOM are normal.   Visual fields full to gross examination. Neck: Normal range of motion. No tracheal deviation present. Cardiovascular: Normal rate. Pulmonary/Chest: Effort normal.   Abdominal: She exhibits no distension. Musculoskeletal: Normal range of motion. Neurological:    Alert to self and place, not to year   Face symmetric    Motor strength symmetric and full   Sensation intact to light touch    Skin: Skin is warm and dry.    Psychiatric: Thought content normal.         DATA:  CBC:   Lab Results   Component Value Date    WBC 19.8 04/15/2019    RBC 4.98 04/15/2019    HGB 14.5 04/15/2019    HCT 43.3 04/15/2019    MCV 86.9 04/15/2019    MCH 29.1 04/15/2019    MCHC 33.5 04/15/2019    RDW 15.4 04/15/2019     04/15/2019    MPV 10.2 04/15/2019     BMP:    Lab Results   Component Value Date     04/15/2019    K 4.1 04/15/2019     04/15/2019    CO2 22 04/15/2019    BUN 36 04/15/2019    LABALBU 4.6 04/11/2019    CREATININE 0.9 04/15/2019    CALCIUM 9.2 04/15/2019    GFRAA >60 04/15/2019    LABGLOM >60 04/15/2019    GLUCOSE 240 04/15/2019     PT/INR:  No results found for: PROTIME, INR  PTT:  No results found for: APTT, PTT[APTT}    Current Inpatient Medications  Current Facility-Administered Medications: HYDROmorphone (DILAUDID) injection 0.25 mg, 0.25 mg, Intravenous, Q5 Min PRN  calcium carbonate (TUMS) chewable tablet 1,000 mg, 1,000 mg, Oral, TID PRN  sodium chloride flush 0.9 % injection 10 mL, 10 mL, Intravenous, BID  LORazepam (ATIVAN) injection 2 mg, 2 mg, Intravenous, Once  acetaminophen (TYLENOL) tablet 650 mg, 650 mg, Oral, Q6H PRN  HYDROcodone-acetaminophen (NORCO)  MG per tablet 1 tablet, 1 tablet, Oral, Q4H PRN  mirtazapine (REMERON) tablet 30 mg, 30 mg, Oral, Nightly  triamterene-hydrochlorothiazide (MAXZIDE-25) 37.5-25 MG per tablet 1 tablet, 1 tablet, Oral, Daily  dexamethasone (DECADRON) injection 4 mg, 4 mg, Intravenous, Q6H  enoxaparin (LOVENOX) injection 30 mg, 30 mg, Subcutaneous, Daily    ASSESSMENT:   New problem: new left parietal-occipital brain lesion, probable metastatic disease  Hx of stage 3 adenocarcinoma of lung      Rollo Racer is 74years old. She has history of stage III adenocarcinoma of the lung. She had final needle aspiration of the left upper lobe mass on May 17, 2018. She has been receiving chemotherapy and has also received radiation. She also has hx history of right parotidectomy, hysterectomy, and cholecystectomy. She previously smoked.     4/11 HCT:  mass of the left posterior parietal occipital junction 2.6 cm, surrounding vasogenic edema    4/13 MRI brain: solitary 2.6 cm mass along occipital horn of left lateral ventricle. No other lesions noted. PLAN:  -Oncology planning radiation for brain lesion.        Electronically signed by Demetrio Holt PA-C on 4/15/2019 at 9:07 AM       I independently saw, evaluated, and examined the patient. Agree with plan outlined above.    Electronically signed by Baltazar Estes MD on 4/15/2019 at 6:37 PM

## 2019-04-16 LAB
ANION GAP SERPL CALCULATED.3IONS-SCNC: 12 MMOL/L (ref 7–16)
BUN BLDV-MCNC: 35 MG/DL (ref 8–23)
CALCIUM SERPL-MCNC: 8.8 MG/DL (ref 8.6–10.2)
CHLORIDE BLD-SCNC: 101 MMOL/L (ref 98–107)
CO2: 24 MMOL/L (ref 22–29)
CREAT SERPL-MCNC: 0.9 MG/DL (ref 0.5–1)
GFR AFRICAN AMERICAN: >60
GFR NON-AFRICAN AMERICAN: >60 ML/MIN/1.73
GLUCOSE BLD-MCNC: 206 MG/DL (ref 74–99)
HCT VFR BLD CALC: 44.4 % (ref 34–48)
HEMOGLOBIN: 14.7 G/DL (ref 11.5–15.5)
MCH RBC QN AUTO: 28.5 PG (ref 26–35)
MCHC RBC AUTO-ENTMCNC: 33.1 % (ref 32–34.5)
MCV RBC AUTO: 86.2 FL (ref 80–99.9)
PDW BLD-RTO: 15.4 FL (ref 11.5–15)
PLATELET # BLD: 277 E9/L (ref 130–450)
PMV BLD AUTO: 9.9 FL (ref 7–12)
POTASSIUM SERPL-SCNC: 4.3 MMOL/L (ref 3.5–5)
RBC # BLD: 5.15 E12/L (ref 3.5–5.5)
SODIUM BLD-SCNC: 137 MMOL/L (ref 132–146)
WBC # BLD: 20 E9/L (ref 4.5–11.5)

## 2019-04-16 PROCEDURE — 1200000000 HC SEMI PRIVATE

## 2019-04-16 PROCEDURE — 99232 SBSQ HOSP IP/OBS MODERATE 35: CPT | Performed by: NEUROLOGICAL SURGERY

## 2019-04-16 PROCEDURE — 6360000002 HC RX W HCPCS: Performed by: FAMILY MEDICINE

## 2019-04-16 PROCEDURE — 36415 COLL VENOUS BLD VENIPUNCTURE: CPT

## 2019-04-16 PROCEDURE — 85027 COMPLETE CBC AUTOMATED: CPT

## 2019-04-16 PROCEDURE — 2580000003 HC RX 258: Performed by: FAMILY MEDICINE

## 2019-04-16 PROCEDURE — 6370000000 HC RX 637 (ALT 250 FOR IP): Performed by: FAMILY MEDICINE

## 2019-04-16 PROCEDURE — 80048 BASIC METABOLIC PNL TOTAL CA: CPT

## 2019-04-16 RX ADMIN — DEXAMETHASONE SODIUM PHOSPHATE 4 MG: 4 INJECTION, SOLUTION INTRAMUSCULAR; INTRAVENOUS at 05:06

## 2019-04-16 RX ADMIN — Medication 10 ML: at 08:26

## 2019-04-16 RX ADMIN — ENOXAPARIN SODIUM 30 MG: 30 INJECTION SUBCUTANEOUS at 08:27

## 2019-04-16 RX ADMIN — Medication 10 ML: at 20:51

## 2019-04-16 RX ADMIN — MIRTAZAPINE 30 MG: 15 TABLET, FILM COATED ORAL at 20:52

## 2019-04-16 RX ADMIN — TRIAMTERENE AND HYDROCHLOROTHIAZIDE 1 TABLET: 37.5; 25 TABLET ORAL at 08:27

## 2019-04-16 RX ADMIN — DEXAMETHASONE SODIUM PHOSPHATE 4 MG: 4 INJECTION, SOLUTION INTRAMUSCULAR; INTRAVENOUS at 18:06

## 2019-04-16 RX ADMIN — DEXAMETHASONE SODIUM PHOSPHATE 4 MG: 4 INJECTION, SOLUTION INTRAMUSCULAR; INTRAVENOUS at 10:28

## 2019-04-16 ASSESSMENT — PAIN SCALES - GENERAL
PAINLEVEL_OUTOF10: 0
PAINLEVEL_OUTOF10: 0

## 2019-04-16 NOTE — PROGRESS NOTES
Wade Ham is a 74 y.o. female patient. Patient says she feels fair at this time. Current Facility-Administered Medications   Medication Dose Route Frequency Provider Last Rate Last Dose    sodium chloride flush 0.9 % injection 10 mL  10 mL Intravenous PRN Estevan Dorsey MD   10 mL at 04/15/19 1740    HYDROmorphone (DILAUDID) injection 0.25 mg  0.25 mg Intravenous Q5 Min PRN Sherrie Jeffries MD        calcium carbonate (TUMS) chewable tablet 1,000 mg  1,000 mg Oral TID PRN Estevan Dorsey MD   1,000 mg at 04/12/19 0112    sodium chloride flush 0.9 % injection 10 mL  10 mL Intravenous BID Estevan Dorsey MD   10 mL at 04/15/19 8214    LORazepam (ATIVAN) injection 2 mg  2 mg Intravenous Once Estevan Dorsey MD        acetaminophen (TYLENOL) tablet 650 mg  650 mg Oral Q6H PRN Estevan Dorsey MD   650 mg at 04/15/19 1402    HYDROcodone-acetaminophen (NORCO)  MG per tablet 1 tablet  1 tablet Oral Q4H PRN Estevan Dorsey MD   1 tablet at 04/12/19 2042    mirtazapine (REMERON) tablet 30 mg  30 mg Oral Nightly Estevan Dorsey MD   30 mg at 04/15/19 2122    triamterene-hydrochlorothiazide (MAXZIDE-25) 37.5-25 MG per tablet 1 tablet  1 tablet Oral Daily Estevan Dorsey MD   1 tablet at 04/15/19 4813    dexamethasone (DECADRON) injection 4 mg  4 mg Intravenous Q6H Estevan Dorsey MD   4 mg at 04/16/19 0506    enoxaparin (LOVENOX) injection 30 mg  30 mg Subcutaneous Daily Estevan Dorsey MD   30 mg at 04/15/19 0909     No Known Allergies  Active Problems:    Metastatic disease ([address]    Brain mass  Resolved Problems:    * No resolved hospital problems. *    Blood pressure 110/64, pulse 88, temperature 97.1 °F (36.2 °C), temperature source Temporal, resp. rate 18, height 5' 5.5\" (1.664 m), weight 140 lb (63.5 kg), SpO2 95 %, not currently breastfeeding. Subjective:  Symptoms:  Stable. Diet:  Adequate intake.     Activity level: Normal.    Pain:  She complains of pain that is mild. Objective:  General Appearance:  Comfortable. Vital signs: (most recent): Blood pressure 110/64, pulse 88, temperature 97.1 °F (36.2 °C), temperature source Temporal, resp. rate 18, height 5' 5.5\" (1.664 m), weight 140 lb (63.5 kg), SpO2 95 %, not currently breastfeeding. No fever. Lungs:  Normal effort and normal respiratory rate. Breath sounds clear to auscultation. Heart: Normal rate. Regular rhythm. S1 normal and S2 normal.    Abdomen: Abdomen is soft. There is no abdominal tenderness. Assessment:  (Brain mass  Lung cancer  HTN    ). Plan:   (MRI brain reviewed as possible primary vs met lesion  Oncology following. Home when hospital bed arranged. Will continue treatment with oncology outpatient. ).        Rosy Love MD  4/16/2019

## 2019-04-16 NOTE — CARE COORDINATION
4/16/2019 social work transition of care  Sw notified by BMs that hospital bed was approved, bms will call Pt's son to set up delivery time.    Electronically signed by EMMY Brooks on 4/16/2019 at 1:14 PM

## 2019-04-16 NOTE — PROGRESS NOTES
(DECADRON) injection 4 mg Q6H   enoxaparin (LOVENOX) injection 30 mg Daily     Todays labs:    Recent Labs     04/14/19  0502 04/15/19  0545 04/16/19  0626   WBC 20.0* 19.8* 20.0*   HGB 13.8 14.5 14.7    270 277   BUN 41* 36* 35*   CREATININE 1.1* 0.9 0.9                                                     Objective:     Patient Vitals for the past 8 hrs:   BP Temp Temp src Pulse Resp SpO2   04/16/19 0724 (!) 107/58 98.9 °F (37.2 °C) Temporal 75 17 95 %             Impression:     Active Problems:    Metastatic disease (HCC)    Brain mass  Resolved Problems:    * No resolved hospital problems. *          Plan:    Will plan for follow up with Dr Graciela Cortes at [facility] after discharge to set up treatment  Plans for [facility] brain lesion   will continue with Decadron  Will arrange after discharge

## 2019-04-16 NOTE — CARE COORDINATION
4/16/2019 social work transition of care  Sw followed up with [employer] [facility] about hospital bed, waiting for approval from insurance. Tiffanie will follow and assist prn.   Electronically signed by EMMY Pepper on 4/16/2019 at 10:28 AM

## 2019-04-16 NOTE — PROGRESS NOTES
Department of Neurosurgery  Progress Note    CHIEF COMPLAINT: brain lesion     SUBJECTIVE:  Mild HA this AM, otherwise feels well. No new issues overnight. REVIEW OF SYSTEMS :  Constitutional: Negative for chills and fever. Neurological: Negative for dizziness, tremors and speech change. OBJECTIVE:   VITALS:  BP (!) 107/58   Pulse 75   Temp 98.9 °F (37.2 °C) (Temporal)   Resp 17   Ht 5' 5.5\" (1.664 m)   Wt 140 lb (63.5 kg)   SpO2 95%   BMI 22.94 kg/m²     PHYSICAL:  Constitutional: She appears well-developed and well-nourished. HENT:   Head: Normocephalic. Eyes: Pupils are equal, round, and reactive to light. Conjunctivae and EOM are normal.   Visual fields full to gross examination. Neck: Normal range of motion. No tracheal deviation present. Cardiovascular: Normal rate. Pulmonary/Chest: Effort normal.   Abdominal: She exhibits no distension. Musculoskeletal: Normal range of motion. Neurological:    Alert to self and place, not to year   Face symmetric    Motor strength symmetric and full   Sensation intact to light touch    Skin: Skin is warm and dry.    Psychiatric: Thought content normal.         DATA:  CBC:   Lab Results   Component Value Date    WBC 20.0 04/16/2019    RBC 5.15 04/16/2019    HGB 14.7 04/16/2019    HCT 44.4 04/16/2019    MCV 86.2 04/16/2019    MCH 28.5 04/16/2019    MCHC 33.1 04/16/2019    RDW 15.4 04/16/2019     04/16/2019    MPV 9.9 04/16/2019     BMP:    Lab Results   Component Value Date     04/16/2019    K 4.3 04/16/2019     04/16/2019    CO2 24 04/16/2019    BUN 35 04/16/2019    LABALBU 4.6 04/11/2019    CREATININE 0.9 04/16/2019    CALCIUM 8.8 04/16/2019    GFRAA >60 04/16/2019    LABGLOM >60 04/16/2019    GLUCOSE 206 04/16/2019     PT/INR:  No results found for: PROTIME, INR  PTT:  No results found for: APTT, PTT[APTT}    Current Inpatient Medications  Current Facility-Administered Medications: sodium chloride flush 0.9 % injection 10 mL, 10 mL, Intravenous, PRN  HYDROmorphone (DILAUDID) injection 0.25 mg, 0.25 mg, Intravenous, Q5 Min PRN  calcium carbonate (TUMS) chewable tablet 1,000 mg, 1,000 mg, Oral, TID PRN  sodium chloride flush 0.9 % injection 10 mL, 10 mL, Intravenous, BID  LORazepam (ATIVAN) injection 2 mg, 2 mg, Intravenous, Once  acetaminophen (TYLENOL) tablet 650 mg, 650 mg, Oral, Q6H PRN  HYDROcodone-acetaminophen (NORCO)  MG per tablet 1 tablet, 1 tablet, Oral, Q4H PRN  mirtazapine (REMERON) tablet 30 mg, 30 mg, Oral, Nightly  triamterene-hydrochlorothiazide (MAXZIDE-25) 37.5-25 MG per tablet 1 tablet, 1 tablet, Oral, Daily  dexamethasone (DECADRON) injection 4 mg, 4 mg, Intravenous, Q6H  enoxaparin (LOVENOX) injection 30 mg, 30 mg, Subcutaneous, Daily    ASSESSMENT:   New problem: new left parietal-occipital brain lesion, probable metastatic disease  Hx of stage 3 adenocarcinoma of lung      Adalberto Marion is 74years old. She has history of stage III adenocarcinoma of the lung. She had final needle aspiration of the left upper lobe mass on May 17, 2018. She has been receiving chemotherapy and has also received radiation. She also has hx history of right parotidectomy, hysterectomy, and cholecystectomy. She previously smoked.     4/11 HCT:  mass of the left posterior parietal occipital junction 2.6 cm, surrounding vasogenic edema    4/13 MRI brain: solitary 2.6 cm mass along occipital horn of left lateral ventricle. No other lesions noted. PLAN:  -Oncology planning radiation for brain lesion.        Electronically signed by Mckinley Tejada PA-C on 4/16/2019 at 9:31 AM        I independently saw, evaluated, and examined the patient. Agree with plan outlined above.    Electronically signed by Nellie Tompkins MD on 4/16/2019 at 4:24 PM

## 2019-04-17 VITALS
RESPIRATION RATE: 16 BRPM | TEMPERATURE: 98.5 F | OXYGEN SATURATION: 95 % | SYSTOLIC BLOOD PRESSURE: 100 MMHG | HEART RATE: 84 BPM | HEIGHT: 66 IN | BODY MASS INDEX: 22.5 KG/M2 | DIASTOLIC BLOOD PRESSURE: 70 MMHG | WEIGHT: 140 LBS

## 2019-04-17 LAB
ANION GAP SERPL CALCULATED.3IONS-SCNC: 11 MMOL/L (ref 7–16)
BUN BLDV-MCNC: 36 MG/DL (ref 8–23)
CALCIUM SERPL-MCNC: 9 MG/DL (ref 8.6–10.2)
CHLORIDE BLD-SCNC: 100 MMOL/L (ref 98–107)
CO2: 25 MMOL/L (ref 22–29)
CREAT SERPL-MCNC: 0.9 MG/DL (ref 0.5–1)
GFR AFRICAN AMERICAN: >60
GFR NON-AFRICAN AMERICAN: >60 ML/MIN/1.73
GLUCOSE BLD-MCNC: 181 MG/DL (ref 74–99)
HCT VFR BLD CALC: 45.4 % (ref 34–48)
HEMOGLOBIN: 15.2 G/DL (ref 11.5–15.5)
MCH RBC QN AUTO: 28.8 PG (ref 26–35)
MCHC RBC AUTO-ENTMCNC: 33.5 % (ref 32–34.5)
MCV RBC AUTO: 86 FL (ref 80–99.9)
PDW BLD-RTO: 15.4 FL (ref 11.5–15)
PLATELET # BLD: 281 E9/L (ref 130–450)
PMV BLD AUTO: 9.9 FL (ref 7–12)
POTASSIUM SERPL-SCNC: 4.4 MMOL/L (ref 3.5–5)
RBC # BLD: 5.28 E12/L (ref 3.5–5.5)
SODIUM BLD-SCNC: 136 MMOL/L (ref 132–146)
WBC # BLD: 22.1 E9/L (ref 4.5–11.5)

## 2019-04-17 PROCEDURE — 6360000002 HC RX W HCPCS: Performed by: FAMILY MEDICINE

## 2019-04-17 PROCEDURE — 80048 BASIC METABOLIC PNL TOTAL CA: CPT

## 2019-04-17 PROCEDURE — 6370000000 HC RX 637 (ALT 250 FOR IP): Performed by: FAMILY MEDICINE

## 2019-04-17 PROCEDURE — 85027 COMPLETE CBC AUTOMATED: CPT

## 2019-04-17 PROCEDURE — 36415 COLL VENOUS BLD VENIPUNCTURE: CPT

## 2019-04-17 PROCEDURE — 2580000003 HC RX 258: Performed by: FAMILY MEDICINE

## 2019-04-17 RX ORDER — DEXAMETHASONE 4 MG/1
4 TABLET ORAL EVERY 6 HOURS
Qty: 120 TABLET | Refills: 0 | Status: SHIPPED | OUTPATIENT
Start: 2019-04-17 | End: 2019-05-17

## 2019-04-17 RX ORDER — DEXAMETHASONE 4 MG/1
4 TABLET ORAL EVERY 6 HOURS SCHEDULED
Status: DISCONTINUED | OUTPATIENT
Start: 2019-04-17 | End: 2019-04-17 | Stop reason: HOSPADM

## 2019-04-17 RX ORDER — SUCRALFATE 1 G/1
1 TABLET ORAL EVERY 8 HOURS SCHEDULED
Status: DISCONTINUED | OUTPATIENT
Start: 2019-04-17 | End: 2019-04-17 | Stop reason: HOSPADM

## 2019-04-17 RX ADMIN — TRIAMTERENE AND HYDROCHLOROTHIAZIDE 1 TABLET: 37.5; 25 TABLET ORAL at 08:12

## 2019-04-17 RX ADMIN — ENOXAPARIN SODIUM 30 MG: 30 INJECTION SUBCUTANEOUS at 08:12

## 2019-04-17 RX ADMIN — DEXAMETHASONE SODIUM PHOSPHATE 4 MG: 4 INJECTION, SOLUTION INTRAMUSCULAR; INTRAVENOUS at 07:08

## 2019-04-17 RX ADMIN — DEXAMETHASONE SODIUM PHOSPHATE 4 MG: 4 INJECTION, SOLUTION INTRAMUSCULAR; INTRAVENOUS at 00:17

## 2019-04-17 RX ADMIN — Medication 10 ML: at 08:12

## 2019-04-17 ASSESSMENT — PAIN SCALES - GENERAL
PAINLEVEL_OUTOF10: 0
PAINLEVEL_OUTOF10: 0

## 2019-04-17 NOTE — PROGRESS NOTES
Call placed to [facility] on [address] to see if script could be sent to patients current pharmacy, they said the patient can call and have them transferred to her current pharmacy

## 2019-04-17 NOTE — DISCHARGE INSTR - COC
Continuity of Care Form    Patient Name: Saul Merlin   :  1945  MRN:  [id number]    Admit date:  2019  Discharge date:  19      Code Status Order: Prior   Advance Directives:   [facility] Documentation     Date/Time Healthcare Directive Type of Healthcare Directive Copy in [facility] [address] Agent's Name Healthcare Agent's Phone Number    19 1402  No, patient does not have an advance directive for healthcare treatment -- -- -- -- --          Admitting Physician:  Rosy Love MD  PCP: Haven Odonnell MD    Discharging Nurse: [facility] Unit/Room#: 1701/1292-X  Discharging Unit Phone Number: ***    Emergency Contact:   Extended Emergency Contact Information  Primary Emergency Contact: ValdesGoldy  Address: [address] [place] [address] Phone: [phone]  Mobile Phone: [phone]  Relation: Child    Past Surgical History:  Past Surgical History:   Procedure Laterality Date    ABSCESS DRAINAGE      several / breast    CHOLECYSTECTOMY      open    HYSTERECTOMY  's    OTHER SURGICAL HISTORY Left 2017    superficial parotoidectomy(cyst removal)    PAROTIDECTOMY Right     superficial parotidectomy    WRIST SURGERY Left     orif       Immunization History:   Immunization History   Administered Date(s) Administered    Influenza Virus Vaccine 10/06/2016       Active Problems:  Patient Active Problem List   Diagnosis Code    Warthin's tumor D11.9    Cancer [address] [address] C80.1    Abscess L02.91    Metastatic disease ([facility] [address] C79.9    Brain mass G93.9       Isolation/Infection:   Isolation          No Isolation            Nurse Assessment:  Last Vital Signs: /70   Pulse 84   Temp 98.5 °F (36.9 °C) (Temporal)   Resp 16   Ht 5' 5.5\" (1.664 m)   Wt 140 lb (63.5 kg)   SpO2 95%   BMI 22.94 kg/m²     Last documented pain score (0-10 scale): Pain Level: 0  Last Weight: Wt Readings from Last 1 Encounters:   04/11/19 140 lb (63.5 kg)     Mental Status:  oriented and alert  Confusion at times    IV Access:  [facility] SONNY IV ACCESS:[id number]}    Nursing Mobility/ADLs:  Walking   Assisted  Transfer  Independent  Bathing  Assisted  Dressing  Assisted  [address] Delivery   whole    Wound Care Documentation and Therapy:  Incision 07/24/17 Neck (Active)   Number of days: 631        Elimination:  Continence:   · Bowel: Yes  · Bladder: Yes  Urinary Catheter: None   Colostomy/Ileostomy/Ileal Conduit: No       Date of Last BM: ***    Intake/Output Summary (Last 24 hours) at 4/17/2019 1015  Last data filed at 4/16/2019 1927  Gross per 24 hour   Intake 200 ml   Output --   Net 200 ml     I/O last 3 completed shifts: In: 200 [P.O.:200]  Out: -     Safety Concerns:     History of Falls (last 30 days) and At Risk for Falls    Impairments/Disabilities:      None    Nutrition Therapy:  Current Nutrition Therapy:   - Oral Diet:  General    Routes of Feeding: Oral  Liquids: No Restrictions  Daily Fluid Restriction: no  Last Modified Barium Swallow with Video (Video Swallowing Test): not done    Treatments at the Time of Hospital Discharge:   Respiratory Treatments: ***  Oxygen Therapy:  is not on home oxygen therapy.   Ventilator:    - No ventilator support    Rehab Therapies: Physical Therapy and Occupational Therapy  Weight Bearing Status/Restrictions: No weight bearing restirctions  Other Medical Equipment (for information only, NOT a DME order):  wheelchair  Other Treatments: ***    Patient's personal belongings (please select all that are sent with patient):  Glasses, Dentures upper and lower    RN SIGNATURE:  Electronically signed by Demetrius Guaman RN on 4/17/19 at 10:20 AM    CASE MANAGEMENT/SOCIAL WORK SECTION    Inpatient Status Date: ***    Readmission Risk Assessment Score:  Readmission Risk              Risk of Unplanned

## 2019-04-17 NOTE — PROGRESS NOTES
4/17/2019 11:29 AM   [facility]     Subjective: Jaguar Owen is a 74year old patient of Dr. Rory Bee from the [facility] with stage IIIC locally advanced adenocarcinoma of the left upper lobe. Treated with concurrent chemoRT with carboplatin and paclitaxel from 6/12/18 to 8/14/18 with good response. Currently on consolidative immunotherapy with Imfinzi since 10/23/18, last on 4/2/19. Admitted after a fall. CT head reveals a L posterior parietoccipital junction mass measuring 2.6 compressing the L lateral ventricle. Significant amount of vasogenic edema and midline shift to the right. Complaining of frontal headache with some dizziness    MRI of the head results; There is a heterogeneous mass along the occipital horn of the left  lateral ventricle. It has cystic/necrotic change. Mass maximally  measures 2.6 cm in transverse dimension. There is compression of the  posterior body and occipital horn left lateral ventricle. There is no  midline shift. There is considerable amount of left-sided vasogenic  edema involving the temporal, parietal and occipital region. On FLAIR imaging there is moderate chronic ischemic/degenerative  changes in the white matter. There is mild central pontine myelolysis. Upon contrast administration, the mass peripherally enhances. It abuts  the ependyma of the left lateral ventricle. No other enhancing lesions are noted.     Current meds:    sodium chloride flush 0.9 % injection 10 mL PRN   calcium carbonate (TUMS) chewable tablet 1,000 mg TID PRN   sodium chloride flush 0.9 % injection 10 mL BID   LORazepam (ATIVAN) injection 2 mg Once   acetaminophen (TYLENOL) tablet 650 mg Q6H PRN   HYDROcodone-acetaminophen (NORCO)  MG per tablet 1 tablet Q4H PRN   mirtazapine (REMERON) tablet 30 mg Nightly   triamterene-hydrochlorothiazide (MAXZIDE-25) 37.5-25 MG per tablet 1 tablet Daily   dexamethasone (DECADRON) injection 4 mg Q6H   enoxaparin (LOVENOX)

## 2019-04-17 NOTE — PROGRESS NOTES
Department of Neurosurgery  Progress Note    CHIEF COMPLAINT: brain lesion     SUBJECTIVE:  Discharge planning. No new issues overnight. REVIEW OF SYSTEMS :  Constitutional: Negative for chills and fever. Neurological: Negative for dizziness, tremors and speech change. OBJECTIVE:   VITALS:  /70   Pulse 84   Temp 98.5 °F (36.9 °C) (Temporal)   Resp 16   Ht 5' 5.5\" (1.664 m)   Wt 140 lb (63.5 kg)   SpO2 95%   BMI 22.94 kg/m²     PHYSICAL:  Constitutional: She appears well-developed and well-nourished. HENT:   Head: Normocephalic. Eyes: Pupils are equal, round, and reactive to light. Conjunctivae and EOM are normal.   Visual fields full to gross examination. Neck: Normal range of motion. No tracheal deviation present. Cardiovascular: Normal rate. Pulmonary/Chest: Effort normal.   Abdominal: She exhibits no distension. Musculoskeletal: Normal range of motion. Neurological:    Alert to self and place, not to year   Face symmetric    Motor strength symmetric and full   Sensation intact to light touch    Skin: Skin is warm and dry.    Psychiatric: Thought content normal.         DATA:  CBC:   Lab Results   Component Value Date    WBC 22.1 04/17/2019    RBC 5.28 04/17/2019    HGB 15.2 04/17/2019    HCT 45.4 04/17/2019    MCV 86.0 04/17/2019    MCH 28.8 04/17/2019    MCHC 33.5 04/17/2019    RDW 15.4 04/17/2019     04/17/2019    MPV 9.9 04/17/2019     BMP:    Lab Results   Component Value Date     04/17/2019    K 4.4 04/17/2019     04/17/2019    CO2 25 04/17/2019    BUN 36 04/17/2019    LABALBU 4.6 04/11/2019    CREATININE 0.9 04/17/2019    CALCIUM 9.0 04/17/2019    GFRAA >60 04/17/2019    LABGLOM >60 04/17/2019    GLUCOSE 181 04/17/2019     PT/INR:  No results found for: PROTIME, INR  PTT:  No results found for: APTT, PTT[APTT}    Current Inpatient Medications  Current Facility-Administered Medications: sodium chloride flush 0.9 % injection 10 mL, 10 mL, Intravenous, PRN  calcium carbonate (TUMS) chewable tablet 1,000 mg, 1,000 mg, Oral, TID PRN  sodium chloride flush 0.9 % injection 10 mL, 10 mL, Intravenous, BID  LORazepam (ATIVAN) injection 2 mg, 2 mg, Intravenous, Once  acetaminophen (TYLENOL) tablet 650 mg, 650 mg, Oral, Q6H PRN  HYDROcodone-acetaminophen (NORCO)  MG per tablet 1 tablet, 1 tablet, Oral, Q4H PRN  mirtazapine (REMERON) tablet 30 mg, 30 mg, Oral, Nightly  triamterene-hydrochlorothiazide (MAXZIDE-25) 37.5-25 MG per tablet 1 tablet, 1 tablet, Oral, Daily  dexamethasone (DECADRON) injection 4 mg, 4 mg, Intravenous, Q6H  enoxaparin (LOVENOX) injection 30 mg, 30 mg, Subcutaneous, Daily    ASSESSMENT:   New problem: new left parietal-occipital brain lesion, probable metastatic disease  Hx of stage 3 adenocarcinoma of lung      Peggy Randall is 74years old. She has history of stage III adenocarcinoma of the lung. She had final needle aspiration of the left upper lobe mass on May 17, 2018. She has been receiving chemotherapy and has also received radiation. She also has hx history of right parotidectomy, hysterectomy, and cholecystectomy. She previously smoked.     4/11 HCT:  mass of the left posterior parietal occipital junction 2.6 cm, surrounding vasogenic edema    4/13 MRI brain: solitary 2.6 cm mass along occipital horn of left lateral ventricle. No other lesions noted.       PLAN:  -Oncology planning radiation for brain lesion.   -Discharge planning    Electronically signed by Drew Montalvo PA-C on 4/17/2019 at 10:01 AM

## 2019-04-18 NOTE — DISCHARGE SUMMARY
Physician Discharge Summary     Patient ID:  Zi Serrano  [id number]  71 y.o.  1945    Admit date: 4/11/2019    Discharge date and time: 4/17/2019 11:33 AM     Admitting Physician: Charo Fenton MD     Discharge Physician: Aj Eisenberg MD    Admission Diagnoses: Metastatic disease ([facility] [address] [C79.9]  Brain mass [G93.9]    Discharge Diagnoses: SAME    Admission Condition: fair    Discharged Condition: stable    Indication for Admission: Brain mass    Hospital Course: Patient admitted with known lung ca and found to have new brain mass. Patient will see Oncology outpatient for radiation treatment.     Consults: Hem/Onc, Neurosurgery    Significant Diagnostic Studies: MRI brain    Treatments: none    Discharge Exam:  /70   Pulse 84   Temp 98.5 °F (36.9 °C) (Temporal)   Resp 16   Ht 5' 5.5\" (1.664 m)   Wt 140 lb (63.5 kg)   SpO2 95%   BMI 22.94 kg/m²     General Appearance:    Alert, cooperative, no distress, appears stated age   Head:    Normocephalic, without obvious abnormality, atraumatic   Eyes:    PERRL, conjunctiva/corneas clear, EOM's intact, fundi     benign, both eyes   Ears:    Normal TM's and external ear canals, both ears   Nose:   Nares normal, septum midline, mucosa normal, no drainage    or sinus tenderness   Throat:   Lips, mucosa, and tongue normal; teeth and gums normal   Neck:   Supple, symmetrical, trachea midline, no adenopathy;     thyroid:  no enlargement/tenderness/nodules; no carotid    bruit or JVD   Back:     Symmetric, no curvature, ROM normal, no CVA tenderness   Lungs:     Clear to auscultation bilaterally, respirations unlabored   Chest Wall:    No tenderness or deformity    Heart:    Regular rate and rhythm, S1 and S2 normal, no murmur, rub   or gallop   Breast Exam:    No tenderness, masses, or nipple abnormality   Abdomen:     Soft, non-tender, bowel sounds active all four quadrants,     no masses, no organomegaly   Genitalia:    Normal female without lesion, (PROBIOTIC DAILY PO) Comments:   Reason for Stopping:         NONFORMULARY Comments:   Reason for Stopping:         triamterene-hydrochlorothiazide (DYAZIDE) 50-25 MG per capsule Comments:   Reason for Stopping:             Activity: activity as tolerated  Diet: regular diet  Wound Care: none needed    Follow-up with radiation oncology as directed    Signed:  Taylor Garces MD  4/18/2019  7:01 AM

## 2019-04-19 LAB
EKG ATRIAL RATE: 94 BPM
EKG P AXIS: 83 DEGREES
EKG P-R INTERVAL: 158 MS
EKG Q-T INTERVAL: 360 MS
EKG QRS DURATION: 76 MS
EKG QTC CALCULATION (BAZETT): 450 MS
EKG R AXIS: 107 DEGREES
EKG T AXIS: 83 DEGREES
EKG VENTRICULAR RATE: 94 BPM

## 2019-04-22 ENCOUNTER — HOSPITAL ENCOUNTER (OUTPATIENT)
Dept: RADIATION ONCOLOGY | Age: 74
Discharge: HOME | End: 2019-04-22
Payer: COMMERCIAL

## 2019-04-22 VITALS
BODY MASS INDEX: 27.09 KG/M2 | HEART RATE: 94 BPM | SYSTOLIC BLOOD PRESSURE: 102 MMHG | DIASTOLIC BLOOD PRESSURE: 70 MMHG | WEIGHT: 165.3 LBS | OXYGEN SATURATION: 98 %

## 2019-04-22 DIAGNOSIS — C80.1 CANCER (HCC): Primary | ICD-10-CM

## 2019-04-22 PROCEDURE — 99215 OFFICE O/P EST HI 40 MIN: CPT

## 2019-04-22 PROCEDURE — 99213 OFFICE O/P EST LOW 20 MIN: CPT

## 2019-04-22 PROCEDURE — 99215 OFFICE O/P EST HI 40 MIN: CPT | Performed by: RADIOLOGY

## 2019-04-22 NOTE — PROGRESS NOTES
superficial parotidectomy    WRIST SURGERY Left     orif       Family History   Problem Relation Age of Onset   [facility] Cancer Mother         colon    Other Father         brain tumor    Cancer Sister         colon    Diabetes Brother     Cancer Other         -niece on mom's side       Current Outpatient Medications   Medication Sig Dispense Refill    dexamethasone (DECADRON) 4 MG tablet Take 1 tablet by mouth every 6 hours 120 tablet 0    triamterene-hydrochlorothiazide (MAXZIDE-25) 37.5-25 MG per tablet Take 1 tablet by mouth daily  12    HYDROcodone-acetaminophen (NORCO)  MG per tablet TAKE 1 TABLET BY MOUTH EVERY 4 HOURS AS NEEDED FOR PAIN  0    levothyroxine (SYNTHROID) 25 MCG tablet Take 25 mcg by mouth daily  3     No current facility-administered medications for this encounter. No Known Allergies      Social History     Socioeconomic History    Marital status:       Spouse name: None    Number of children: None    Years of education: None    Highest education level: None   Occupational History    Occupation: retired   Social Needs    Financial resource strain: None    Food insecurity:     Worry: None     Inability: None    Transportation needs:     Medical: None     Non-medical: None   Tobacco Use    Smoking status: Former Smoker     Packs/day: 1.00     Years: 50.00     Pack years: 50.00     Last attempt to quit: 2018     Years since quittin.1    Smokeless tobacco: Never Used   Substance and Sexual Activity    Alcohol use: No    Drug use: No    Sexual activity: None   Lifestyle    Physical activity:     Days per week: None     Minutes per session: None    Stress: None   Relationships    Social connections:     Talks on phone: None     Gets together: None     Attends Muslim service: None     Active member of club or organization: None     Attends meetings of clubs or organizations: None     Relationship status: None    Intimate partner violence:     Fear of current or ex partner: None     Emotionally abused: None     Physically abused: None     Forced sexual activity: None   Other Topics Concern    None   Social History Narrative    Lives on the [place] alone, son lives nearby. Retired from [employer]. Review of Systems - History obtained from chart review  General ROS: positive for  - fatigue  Psychological ROS: negative  Allergy and Immunology ROS: negative  Hematological and Lymphatic ROS: negative  Endocrine ROS: negative  Breast ROS: negative for breast lumps  Respiratory ROS: no cough, shortness of breath, or wheezing  Cardiovascular ROS: no chest pain or dyspnea on exertion  Gastrointestinal ROS: no abdominal pain, change in bowel habits, or black or bloody stools  Genito-Urinary ROS: no dysuria, trouble voiding, or hematuria  Musculoskeletal ROS: generalized weakness  Neurological ROS: numbness in hand and feet, periodic pre-syncope  Dermatological ROS: negative        Physical Exam   Constitutional: She is oriented to person, place, and time. She appears well-developed. HENT:   Head: Normocephalic. Eyes: Pupils are equal, round, and reactive to light. Neck: Normal range of motion. Neck supple. Cardiovascular: Normal rate, regular rhythm and normal heart sounds. Pulmonary/Chest: Effort normal. No respiratory distress. Abdominal: Soft. Musculoskeletal: Normal range of motion. Neurological: She is alert and oriented to person, place, and time. Skin: Skin is warm and dry. Psychiatric: She has a normal mood and affect. Her behavior is normal. Judgment and thought content normal.         Imaging reviewed:      MRI brain 4/13/19:  Impression   Solitary 2.6 cm mass abutting the ependyma of the posterior body and   occipital horn left lateral ventricle. This could reflect a primary   malignancy. Metastatic disease is not excluded but no other lesions   are noted.  There is considerable amount of left hemispheric vasogenic   edema.     Radiation Safety and Treatment Support:  -previous Radiation history: Yes -thorax  -history of connective tissue disease: No  -history of autoimmune disease: No  -pregnant: not applicable  -nutrition consult prior to [address]: Yes  -PEG: No  -Dental evaluation prior to treatment:No  -Social Work requested: Yes  -Oncology Nurse Navigator requested: Yes  -pre + post treatment PT / Rehab / PM+R evaluation considered: Yes  -ICD: No   -ICD brand: -  -ACS patient navigator: Megan Rendon  -Nurse Practitioners for Radiation Oncology:    ---Chai Brooke, MSN, RN, FNP-C   ---Santos Gay, MSN, RN, FNP-BC        Assessment and Plan: Ron Mccormick is a pleasant and cooperative 74year old with a recent diagnosis of AJCC stage group IV lung CA with a solitary CNS lesion (probable). We recommend consideration for up front SRS. However if the pt is unwilling to travel, we recommend fractionated external beam radiation therapy to the whole brain to reduce the risk of neurologic death and prolong functional capacity Rocio Velarde et al. [place]. 2007 Jul;12(7):884-98). Typical fractionation schemes are 30 Gy in 10 fractions and 37.50 Gy in 15 fractions (RTOG 6901 /// NCCN 1.2016 BRAIN-C 2/3). Hippocampal sparing techniques (IMRT) may be considered in specific clinical situations, as well as concurrent memantine (RTOG 0614) and SRS boost Asa Pee BAR et al. Int J Radiat Oncol Biol Phys. 1999 Sep 1;45(2):427-34). The risks, benefits, alternatives, process and logistics of external beam radiation to the whole brain were reviewed (risks include but are not limited to: herniation risk, seizure, nausea, malaise and HA). We answered all of the patient's questions to the best of our ability. The patient verbalized understanding and seemed satisfied. Radiation planning will commence within 7 days; the next step in management being the simulation scan, with external beam radiation to commence in a timely fashion thereafter.    It was a pleasure meeting Dodie today and we appreciate the referral and opportunity to be involved in her care. We had an extensive discussion today regarding the course to date (including a focused review of theapplicable radiographic and laboratory information), multidisciplinary approach to cancer care, and indications for external beam radiation therapy as a component therein. A literature review and multidisciplinary discussion was performed after seeing this patient due to the complexity of the medical decision making in this case. I personally spent greater than 70 minutes with this patient and performed the complete history and physical as above at today's visit, at least 45 minutes was in direct discussion and  regarding disease management.          -cont PT/OT  -pt will call this week, re WBRT +/- SRS boost vs SRS up front  [RN STEFANIE]   -family wants to discuss. Flori Antoine. Janell Rosen MD [place] [address] Oncology  Cell: [phone]    [facility]:  [facility]: [phone]  [address] [place] [address] Street:  [phone]   FAX:    [phone]  [address] [place] [address] Road:  [phone]   FAX:  [phone]        NOTE: This report was transcribed using voice recognition software. Every effort was made to ensure accuracy; however, inadvertent computerized transcription errors may be present.

## 2019-04-22 NOTE — PROGRESS NOTES
Le Nearing  1945 74 y.o. Referring Physician:  Dr Bharath Kumar    PCP: Francis Holder MD     Vitals:    19 1350   BP: 102/70   Pulse: 94   SpO2: 98%        Wt Readings from Last 3 Encounters:   19 165 lb 4.8 oz (75 kg)   19 140 lb (63.5 kg)   10/02/18 140 lb 9.6 oz (63.8 kg)        Body mass index is 27.09 kg/m². Chief Complaint: No chief complaint on file. Cancer Staging  No matching staging information was found for the patient. Prior Radiation Therapy? YES: Site Treated: LEMUEL/Mediastinum          Facility: [facility]          Date: 2018-8/15/2018    Concurrent Chemo/radiation? YES: Site Treated: LEMUEL/Mediastinum          Facility: The [address] [place] [address]           Date: concurrently with Radiation, 2018-2018 with Carboplatin/Paclitaxel    Prior Chemotherapy? NO    Prior Hormonal Therapy? NO    Head and Neck Cancer? Yes, patient has HN cancer AND consulting physician AGREES to place MBSS and speech therapy referral.      LMP: na    Age at first Menses: na    : na    Para: na        Current Outpatient Medications   Medication Sig Dispense Refill    dexamethasone (DECADRON) 4 MG tablet Take 1 tablet by mouth every 6 hours 120 tablet 0    triamterene-hydrochlorothiazide (MAXZIDE-25) 37.5-25 MG per tablet Take 1 tablet by mouth daily  12    HYDROcodone-acetaminophen (NORCO)  MG per tablet TAKE 1 TABLET BY MOUTH EVERY 4 HOURS AS NEEDED FOR PAIN  0    levothyroxine (SYNTHROID) 25 MCG tablet Take 25 mcg by mouth daily  3     No current facility-administered medications for this encounter.         Past Medical History:   Diagnosis Date    Chronic back pain     Chronic bronchitis ([address]     doing well as of 10/6/2017    Hypertension     Mass of parotid gland     right    PONV (postoperative nausea and vomiting)     Wears dentures     full upper/partial bottom       Past Surgical History:   Procedure Laterality Date    ABSCESS DRAINAGE     several / breast    CHOLECYSTECTOMY      open    HYSTERECTOMY      OTHER SURGICAL HISTORY Left 2017    superficial parotoidectomy(cyst removal)    PAROTIDECTOMY Right     superficial parotidectomy    WRIST SURGERY Left     orif       Family History   Problem Relation Age of Onset    Cancer Mother         colon    Other Father         brain tumor    Cancer Sister         colon    Diabetes Brother     Cancer Other         -niece on mom's side       Social History     Socioeconomic History    Marital status:       Spouse name: Not on file    Number of children: Not on file    Years of education: Not on file    Highest education level: Not on file   Occupational History    Occupation: retired   Social Needs    Financial resource strain: Not on file    Food insecurity:     Worry: Not on file     Inability: Not on file   [employer] needs:     Medical: Not on file     Non-medical: Not on file   Tobacco Use    Smoking status: Former Smoker     Packs/day: 1.00     Years: 50.00     Pack years: 50.00     Last attempt to quit: 2018     Years since quittin.1    Smokeless tobacco: Never Used   Substance and Sexual Activity    Alcohol use: No    Drug use: No    Sexual activity: Not on file   Lifestyle    Physical activity:     Days per week: Not on file     Minutes per session: Not on file    Stress: Not on file   Relationships    Social connections:     Talks on phone: Not on file     Gets together: Not on file     Attends Church service: Not on file     Active member of club or organization: Not on file     Attends meetings of clubs or organizations: Not on file     Relationship status: Not on file    Intimate partner violence:     Fear of current or ex partner: Not on file     Emotionally abused: Not on file     Physically abused: Not on file     Forced sexual activity: Not on file   Other Topics Concern    Not on file   Social History Narrative Lives on the [address] [place] [address] alone, son lives nearby. Retired from [employer]. Occupation: retired   Retired:  YES: Patient is retired from [employer]. REVIEW OF SYSTEMS: <<For Level 5, 10 or more systems>> Approximately 15 minutes was spent with patient and family members, utilizing slides and handouts, related to receiving WBRT related to 2.6cm mass as noted on the MRI of the brain on 4/13/2019. [facility] Medical Oncology-was treated last summer with concurrent Chemo/RT for Stage IIIc lung adenocarcinoma. All patient's questions were answered from a nursing perspective, with patient expressing understanding of the information presented today. Pacemaker/Defibulator/ICD:  No    Mediport: Yes        FALLS RISK SCREENING ASSESSMENT    Instructions:  Assess the patient and enter the appropriate indicators that are present for fall risk identification. Total the numbers entered and assign a fall risk score from Table 2.  Reassess patient at a minimum every 12 weeks or with status change. Assessment   Date  4/22/2019     1. Mental Ability: confusion/cognitively impaired Yes - 3       2. Elimination Issues: incontinence, frequency No - 0       3. Ambulatory: use of assistive devices (walker, cane, off-loading devices), attached to equipment (IV pole, oxygen) Yes - 2     4. Sensory Limitations: dizziness, vertigo, impaired vision Yes - 3       5. Age 72 years or greater - 1       10. Medication: diuretics, strong analgesics, hypnotics, sedatives, antihypertensive agents   Yes - 3   7. Falls:  recent history of falls within the last 3 months (not to include slipping or tripping)   Yes - 7   TOTAL 19    If score of 4 or greater was education given? Yes       TABLE 2   Risk Score Risk Level Plan of Care   0-3 Little or  No Risk 1. Provide assistance as indicated for ambulation activities  2. Reorient confused/cognitively impaired patient  3.   Call-light/bell within patient's reach  4. Chair/bed in low position, stretcher/bed with siderails up except when performing patient care activities  5. Educate patient/family/caregiver on falls prevention  6.  Reassess in 12 weeks or with any noted change in patient condition which places them at a risk for a fall   4-6 Moderate Risk 1. Provide assistance as indicated for ambulation activities  2. Reorient confused/cognitively impaired patient  3. Call-light/bell within patient's reach  4. Chair/bed in low position, stretcher/bed with siderails up except when performing patient care activities  5. Educate patient/family/caregiver on falls prevention  6. Falls risk precaution (Yellow sticker Level II) placed on patient chart   7 or   Higher High Risk 1. Place patient in easily observable treatment room  2. Patient attended at all times by family member or staff  3. Provide assistance as indicated for ambulation activities  4. Reorient confused/cognitively impaired patient  5. Call-light/bell within patient's reach  6. Chair/bed in low position, stretcher/bed with siderails up except when performing patient care activities  7. Educate patient/family/caregiver on falls prevention  8. Falls risk precaution (Yellow sticker Level III) placed on patient chart           MALNUTRITION RISK SCREENING ASSESSMENT    Instructions:  Assess the patient and enter the appropriate indicators that are present for nutrition risk identification. Total the numbers entered and assign a risk score. Follow the appropriate action for total score listed below. Assessment   Date  4/22/2019     1. Have you lost weight without trying? 0- No     2. Have you been eating poorly because of a decreased appetite? 0- No steroid at this time has increased appetite   3. Do you have a diagnosis of head and neck cancer?       1- Yes                                                                                    TOTAL 1          Score of 0-1: No action  Score 2 or greater:  · For Non-Diabetic Patient: Recommend adding Ensure Complete 2 x daily and provide patient with Ensure wellness bag with coupons  · For Diabetic Patient: Recommend adding Glucerna Shake 2 x daily and provide patient with Glucerna Wellness bag with coupons  · Route to the dietitian via [address]    · Are you having  difficulty performing daily routine tasks  due to fatigue or weakness (ie: bathing/showering, dressing, housework, meal prep, work, child Norma Hernandez): Yes     · Do you have any arm flexibility/ROM restrictions, swelling or pain that limit activity: Yes     · Any changes in memory, attention/focus that impact daily activities: Yes     · Do you avoid participation in leisure/social activity due weakness, fatigue or pain: Yes     ARE ANY OF THE ABOVE ARE ANSWERED YES: Yes - but NO OT referral request sent due to patient already being seen by OT. PT ASSESSMENT FOR REFERRAL    · Have you had any recent falls in past 2 months: Yes     · Do you have difficulty  going up/down stairs: Yes     · Are you having difficulty walking: Yes     · Do you often hold onto furniture/environmental supports or feel off balance when you are walking: Yes     · Do you need to take rest breaks when you are walking: Yes     · Any pain on scale of 1-10 that limits your mobility: No 0/10    ARE ANY OF THE ABOVE ARE ANSWERED YES: Yes - but NO PT referral request sent due to patient already being seen by PT.           [address]    The patient reports the following signs/symptoms of lymphedema: None    Please ask the provider to assess patient for lymphedema for any reported signs or symptoms so a referral to Lymphedema Therapy can be considered. PREHAB AUDIOLOGY REFERRAL    - Is patient planned to receive Cisplatin? Unknown. Will check with Dr Babs Dorsey MD and request audiology consult as indicated.     -

## 2019-04-23 NOTE — ADDENDUM NOTE
Encounter addended by: RYANN Shields on: 4/23/2019 10:22 AM   Actions taken: Charge Capture section accepted

## 2019-04-29 ENCOUNTER — TELEPHONE (OUTPATIENT)
Dept: RADIATION ONCOLOGY | Age: 74
End: 2019-04-29

## 2019-05-01 ENCOUNTER — HOSPITAL ENCOUNTER (OUTPATIENT)
Dept: RADIATION ONCOLOGY | Age: 74
Discharge: HOME | End: 2019-05-01
Attending: RADIOLOGY
Payer: COMMERCIAL

## 2019-05-01 PROCEDURE — 77290 THER RAD SIMULAJ FIELD CPLX: CPT | Performed by: RADIOLOGY

## 2019-05-01 PROCEDURE — 77334 RADIATION TREATMENT AID(S): CPT | Performed by: RADIOLOGY

## 2019-05-03 PROCEDURE — 77334 RADIATION TREATMENT AID(S): CPT | Performed by: RADIOLOGY

## 2019-05-03 PROCEDURE — 77307 TELETHX ISODOSE PLAN CPLX: CPT | Performed by: RADIOLOGY

## 2019-05-08 ENCOUNTER — HOSPITAL ENCOUNTER (OUTPATIENT)
Dept: RADIATION ONCOLOGY | Age: 74
Discharge: HOME | End: 2019-05-08
Attending: RADIOLOGY
Payer: COMMERCIAL

## 2019-05-08 VITALS
OXYGEN SATURATION: 96 % | BODY MASS INDEX: 25.56 KG/M2 | DIASTOLIC BLOOD PRESSURE: 70 MMHG | WEIGHT: 156 LBS | SYSTOLIC BLOOD PRESSURE: 92 MMHG | HEART RATE: 101 BPM

## 2019-05-08 DIAGNOSIS — C80.1 CANCER (HCC): Primary | ICD-10-CM

## 2019-05-08 PROCEDURE — 99999 PR OFFICE/OUTPT VISIT,PROCEDURE ONLY: CPT | Performed by: RADIOLOGY

## 2019-05-08 PROCEDURE — 77417 THER RADIOLOGY PORT IMAGE(S): CPT | Performed by: RADIOLOGY

## 2019-05-08 PROCEDURE — 77412 RADIATION TX DELIVERY LVL 3: CPT | Performed by: RADIOLOGY

## 2019-05-08 RX ORDER — MELOXICAM 7.5 MG/1
7.5 TABLET ORAL DAILY
COMMUNITY

## 2019-05-08 RX ORDER — ONDANSETRON 8 MG/1
8 TABLET, ORALLY DISINTEGRATING ORAL EVERY 8 HOURS PRN
COMMUNITY

## 2019-05-08 RX ORDER — PANTOPRAZOLE SODIUM 40 MG/1
40 TABLET, DELAYED RELEASE ORAL DAILY
COMMUNITY

## 2019-05-08 RX ORDER — SUCRALFATE 1 G/1
1 TABLET ORAL 4 TIMES DAILY
COMMUNITY

## 2019-05-08 RX ORDER — MIRTAZAPINE 30 MG/1
30 TABLET, FILM COATED ORAL NIGHTLY
COMMUNITY

## 2019-05-08 RX ORDER — TRAZODONE HYDROCHLORIDE 50 MG/1
50 TABLET ORAL NIGHTLY
COMMUNITY

## 2019-05-08 RX ORDER — OXYCODONE HYDROCHLORIDE 5 MG/1
5 CAPSULE ORAL EVERY 6 HOURS PRN
COMMUNITY

## 2019-05-08 RX ORDER — PROCHLORPERAZINE MALEATE 10 MG
10 TABLET ORAL EVERY 6 HOURS PRN
COMMUNITY

## 2019-05-08 NOTE — PROGRESS NOTES
DEPARTMENT OF RADIATION ONCOLOGY ON TREATMENT VISIT         5/8/2019      NAME:  Francois Figueroa    YOB: 1945    Diagnosis:  Met CA    SUBJECTIVE:   Francois Figueroa has now received 250 cGy in 1/15 fractions directed to the whole brain. Past medical, surgical, social and family histories reviewed and updated as indicated. Pain: controlled    ALLERGIES:  Patient has no known allergies. Current Outpatient Medications   Medication Sig Dispense Refill    meloxicam (MOBIC) 7.5 MG tablet Take 7.5 mg by mouth daily      mirtazapine (REMERON) 30 MG tablet Take 30 mg by mouth nightly      pantoprazole (PROTONIX) 40 MG tablet Take 40 mg by mouth daily      sucralfate (CARAFATE) 1 GM tablet Take 1 g by mouth 4 times daily      dexamethasone (DECADRON) 4 MG tablet Take 1 tablet by mouth every 6 hours 120 tablet 0    triamterene-hydrochlorothiazide (MAXZIDE-25) 37.5-25 MG per tablet Take 1 tablet by mouth daily  12    HYDROcodone-acetaminophen (NORCO)  MG per tablet TAKE 1 TABLET BY MOUTH EVERY 4 HOURS AS NEEDED FOR PAIN  0    levothyroxine (SYNTHROID) 25 MCG tablet Take 25 mcg by mouth daily  3     No current facility-administered medications for this encounter. OBJECTIVE:  Alert and fully ambulatory. Pleasant and conversant. Physical Examination: General appearance - alert, well appearing, and in no distress. Wt Readings from Last 3 Encounters:   05/08/19 156 lb (70.8 kg)   04/22/19 165 lb 4.8 oz (75 kg)   04/11/19 140 lb (63.5 kg)         ASSESSMENT/PLAN:     Patient is tolerating treatments well with expected toxicities. RBA were reviewed prior to first fraction and PRN. Current and planned dose reviewed. Goals of treatment and potential side effects were reviewed with the patient PRN. Treatment imaging has been personally reviewed for accuracy and precision. Questions answered to apparent satisfaction.     Treatments will continue as planned. -memantine x 6 mo / discussed compliance          [place].  Davonte Pascual MD MS DABR  Radiation Oncologist        [facility]): [phone] /// FAX: [phone]  [facility]): [phone] /// FAX: [phone]  [facility]): [phone] /// FAX: [phone]

## 2019-05-08 NOTE — PROGRESS NOTES
Edwige Vazquez  5/8/2019  Wt Readings from Last 10 Encounters:   05/08/19 156 lb (70.8 kg)   04/22/19 165 lb 4.8 oz (75 kg)   04/11/19 140 lb (63.5 kg)   10/02/18 140 lb 9.6 oz (63.8 kg)   08/08/18 157 lb 3.2 oz (71.3 kg)   08/01/18 168 lb 8 oz (76.4 kg)   08/13/18 155 lb 11.2 oz (70.6 kg)   07/18/18 170 lb 8 oz (77.3 kg)   07/11/18 171 lb 9.6 oz (77.8 kg)   06/25/18 161 lb 4.8 oz (73.2 kg)     Ht Readings from Last 1 Encounters:   04/11/19 5' 5.5\" (1.664 m)     Body mass index is 25.56 kg/m². Met with patient today per referral, re: weight loss. Patient was hospitalized, brain mets found, now on steroids and receiving RT. She is here with her son. She and he report that her appetite is good. She is eating \"constantly\", including meals and snacks. She likes ascencio and eggs in the AM, ice cream for snack, yogurt, fruit cocktail, soups, etc. She has still lost 9# since hospitalization. Denies edema or fluid flux. Denies n/v/d/c. Patient was forgetting to take the steroids that were prescribed, but now she is taking them. She will be monitored for further weight loss. Spoke with them about protein shakes, which she refuses. Spoke with them about trying a high protein, full-fat greek yogurt 1-2 times daily, which can deliver ~200-300kcal, 20gm protein each. She was receptive to this. Provided suggestions and will continue to follow. Weight change:9# loss in 6 weeks, though UBW reported at ~160-165#. Appetite: good  N/V/D/C: none  Calculated Needs if applicable: HWN=683#; ~0501CFZS, 70-80gm PRO, ~1800ml h20. Pre-Hab Eligible?: no        Recommendations: Add high protein, full-fat yogurt twice daily to provide ~500kcal, 40gm PRO in supplement to current good meal/snack intake to help maintain weight and provide adequate protein.             [facility] GUIDELINES FOR CLINICAL CHARACTERISTICS OF MALNUTRITION IN CHRONIC ILLNESS     Moderate Malnutrition  Severe Malnutrition    Energy intake  <75% energy intake compared

## 2019-05-09 ENCOUNTER — HOSPITAL ENCOUNTER (OUTPATIENT)
Dept: RADIATION ONCOLOGY | Age: 74
Discharge: HOME | End: 2019-05-09
Attending: RADIOLOGY
Payer: COMMERCIAL

## 2019-05-09 PROCEDURE — 77412 RADIATION TX DELIVERY LVL 3: CPT | Performed by: RADIOLOGY

## 2019-05-10 ENCOUNTER — HOSPITAL ENCOUNTER (OUTPATIENT)
Dept: RADIATION ONCOLOGY | Age: 74
Discharge: HOME | End: 2019-05-10
Attending: RADIOLOGY
Payer: COMMERCIAL

## 2019-05-10 PROCEDURE — 77412 RADIATION TX DELIVERY LVL 3: CPT | Performed by: RADIOLOGY

## 2019-05-13 ENCOUNTER — HOSPITAL ENCOUNTER (OUTPATIENT)
Dept: RADIATION ONCOLOGY | Age: 74
Discharge: HOME | End: 2019-05-13
Attending: RADIOLOGY
Payer: COMMERCIAL

## 2019-05-13 PROCEDURE — 77412 RADIATION TX DELIVERY LVL 3: CPT | Performed by: RADIOLOGY

## 2019-05-14 ENCOUNTER — HOSPITAL ENCOUNTER (OUTPATIENT)
Dept: RADIATION ONCOLOGY | Age: 74
Discharge: HOME | End: 2019-05-14
Attending: RADIOLOGY
Payer: COMMERCIAL

## 2019-05-14 PROCEDURE — 77336 RADIATION PHYSICS CONSULT: CPT | Performed by: RADIOLOGY

## 2019-05-14 PROCEDURE — 77412 RADIATION TX DELIVERY LVL 3: CPT | Performed by: RADIOLOGY

## 2019-05-15 ENCOUNTER — HOSPITAL ENCOUNTER (OUTPATIENT)
Dept: RADIATION ONCOLOGY | Age: 74
Discharge: HOME | End: 2019-05-15
Attending: RADIOLOGY
Payer: COMMERCIAL

## 2019-05-15 ENCOUNTER — TELEPHONE (OUTPATIENT)
Dept: CASE MANAGEMENT | Age: 74
End: 2019-05-15

## 2019-05-15 VITALS
HEART RATE: 69 BPM | WEIGHT: 148.6 LBS | SYSTOLIC BLOOD PRESSURE: 98 MMHG | OXYGEN SATURATION: 92 % | BODY MASS INDEX: 24.35 KG/M2 | DIASTOLIC BLOOD PRESSURE: 70 MMHG

## 2019-05-15 DIAGNOSIS — C80.1 CANCER (HCC): Primary | ICD-10-CM

## 2019-05-15 PROCEDURE — 77417 THER RADIOLOGY PORT IMAGE(S): CPT | Performed by: RADIOLOGY

## 2019-05-15 PROCEDURE — 77412 RADIATION TX DELIVERY LVL 3: CPT | Performed by: RADIOLOGY

## 2019-05-15 PROCEDURE — 99999 PR OFFICE/OUTPT VISIT,PROCEDURE ONLY: CPT | Performed by: RADIOLOGY

## 2019-05-16 ENCOUNTER — HOSPITAL ENCOUNTER (OUTPATIENT)
Dept: INFUSION THERAPY | Age: 74
Discharge: HOME | End: 2019-05-16
Payer: COMMERCIAL

## 2019-05-16 ENCOUNTER — TELEPHONE (OUTPATIENT)
Dept: INFUSION THERAPY | Age: 74
End: 2019-05-16

## 2019-05-16 ENCOUNTER — HOSPITAL ENCOUNTER (OUTPATIENT)
Dept: RADIATION ONCOLOGY | Age: 74
Discharge: HOME | End: 2019-05-16
Attending: RADIOLOGY
Payer: COMMERCIAL

## 2019-05-16 VITALS
SYSTOLIC BLOOD PRESSURE: 115 MMHG | TEMPERATURE: 97.6 F | HEART RATE: 122 BPM | OXYGEN SATURATION: 90 % | RESPIRATION RATE: 40 BRPM | DIASTOLIC BLOOD PRESSURE: 70 MMHG

## 2019-05-16 DIAGNOSIS — C80.1 CANCER (HCC): Primary | ICD-10-CM

## 2019-05-16 PROCEDURE — 77412 RADIATION TX DELIVERY LVL 3: CPT | Performed by: RADIOLOGY

## 2019-05-16 PROCEDURE — 2580000003 HC RX 258: Performed by: RADIOLOGY

## 2019-05-16 PROCEDURE — 6360000002 HC RX W HCPCS: Performed by: RADIOLOGY

## 2019-05-16 RX ORDER — SODIUM CHLORIDE 0.9 % (FLUSH) 0.9 %
10 SYRINGE (ML) INJECTION PRN
Status: DISCONTINUED | OUTPATIENT
Start: 2019-05-16 | End: 2019-05-17 | Stop reason: HOSPADM

## 2019-05-16 RX ORDER — HEPARIN SODIUM (PORCINE) LOCK FLUSH IV SOLN 100 UNIT/ML 100 UNIT/ML
500 SOLUTION INTRAVENOUS PRN
Status: DISCONTINUED | OUTPATIENT
Start: 2019-05-16 | End: 2019-05-17 | Stop reason: HOSPADM

## 2019-05-16 RX ORDER — 0.9 % SODIUM CHLORIDE 0.9 %
1000 INTRAVENOUS SOLUTION INTRAVENOUS ONCE
Status: COMPLETED | OUTPATIENT
Start: 2019-05-16 | End: 2019-05-16

## 2019-05-16 RX ORDER — HEPARIN SODIUM (PORCINE) LOCK FLUSH IV SOLN 100 UNIT/ML 100 UNIT/ML
500 SOLUTION INTRAVENOUS PRN
Status: CANCELLED | OUTPATIENT
Start: 2019-05-16

## 2019-05-16 RX ORDER — 0.9 % SODIUM CHLORIDE 0.9 %
1000 INTRAVENOUS SOLUTION INTRAVENOUS ONCE
Status: CANCELLED | OUTPATIENT
Start: 2019-05-16

## 2019-05-16 RX ORDER — SODIUM CHLORIDE 0.9 % (FLUSH) 0.9 %
10 SYRINGE (ML) INJECTION PRN
Status: CANCELLED | OUTPATIENT
Start: 2019-05-16

## 2019-05-16 RX ADMIN — Medication 10 ML: at 10:37

## 2019-05-16 RX ADMIN — SODIUM CHLORIDE 1000 ML: 9 INJECTION, SOLUTION INTRAVENOUS at 09:30

## 2019-05-16 RX ADMIN — HEPARIN 500 UNITS: 100 SYRINGE at 10:37

## 2019-05-16 NOTE — TELEPHONE ENCOUNTER
Ramona Sharp  5/16/2019  Wt Readings from Last 10 Encounters:   05/15/19 148 lb 9.6 oz (67.4 kg)   05/08/19 156 lb (70.8 kg)   04/22/19 165 lb 4.8 oz (75 kg)   04/11/19 140 lb (63.5 kg)   10/02/18 140 lb 9.6 oz (63.8 kg)   08/08/18 157 lb 3.2 oz (71.3 kg)   08/01/18 168 lb 8 oz (76.4 kg)   08/13/18 155 lb 11.2 oz (70.6 kg)   07/18/18 170 lb 8 oz (77.3 kg)   07/11/18 171 lb 9.6 oz (77.8 kg)     Ht Readings from Last 1 Encounters:   04/11/19 5' 5.5\" (1.664 m)     There is no height or weight on file to calculate BMI. Met with patient today for follow up, she is here with her son. She is doing poorly today, she is showing some signs of SOB, though she denies it. Her son states that she has had some darkened urine in the past couple of days, but he states she is drinking 4 bottles of water daily, and now they have increased to 3 Boost Plus per day. Spoke with patient and her son about her PO intake. It has drastically decreased in the past 4 days, she is eating meals that he is preparing, but not finishing them. He does have planned mealtimes for B,L,D and then they have been doing a Boost with each meal. She has lost 17# in the past 1mo. She was not receptive to taking the Boost as of the last time we met, but now she is taking it only because she knows she has to. She is denying n/v/d/c. She doesn't go to see Dr. Farhat Goff until next month. She was setup for IV fluids today. This clinician met with patient and her son, provided with resources for how to increase calories through the supplemental shakes, due to those going down the easiest. Recommended continuing with 3 Boost Plus per day, but recommended taking at the end of the meal, after she is allowed to try to consume the meal first. She was encouraged to then use heavy cream, peanut butter, banana or yogurt and blend with the Boost for more calories and nutrition. Patients son was receptive. Continue to follow for help meeting nutritional needs.  Patient

## 2019-05-16 NOTE — PROGRESS NOTES
40 mg by mouth daily      sucralfate (CARAFATE) 1 GM tablet Take 1 g by mouth 4 times daily      ondansetron (ZOFRAN-ODT) 8 MG TBDP disintegrating tablet Place 8 mg under the tongue every 8 hours as needed for Nausea or Vomiting      oxyCODONE 5 MG capsule Take 5 mg by mouth every 6 hours as needed for Pain.  prochlorperazine (COMPAZINE) 10 MG tablet Take 10 mg by mouth every 6 hours as needed      traZODone (DESYREL) 50 MG tablet Take 50 mg by mouth nightly      dexamethasone (DECADRON) 4 MG tablet Take 1 tablet by mouth every 6 hours (Patient taking differently: Take 4 mg by mouth three times daily ) 120 tablet 0    triamterene-hydrochlorothiazide (MAXZIDE-25) 37.5-25 MG per tablet Take 1 tablet by mouth daily  12    HYDROcodone-acetaminophen (NORCO)  MG per tablet TAKE 1 TABLET BY MOUTH EVERY 4 HOURS AS NEEDED FOR PAIN  0    levothyroxine (SYNTHROID) 25 MCG tablet Take 25 mcg by mouth daily  3     No current facility-administered medications for this encounter. Facility-Administered Medications Ordered in Other Encounters   Medication Dose Route Frequency Provider Last Rate Last Dose    sodium chloride flush 0.9 % injection 10 mL  10 mL Intravenous PRN Denny Cobb III, MD   10 mL at 05/16/19 1037    heparin flush 100 UNIT/ML injection 500 Units  500 Units Intracatheter PRN Denny Cobb III, MD   500 Units at 05/16/19 1037       OBJECTIVE:     Wt Readings from Last 3 Encounters:   05/15/19 148 lb 9.6 oz (67.4 kg)   05/08/19 156 lb (70.8 kg)   04/22/19 165 lb 4.8 oz (75 kg)     Vital signs:  Temperature: 97.9 axillary  Pulse: 75   B/P: 100/60 manual       On patient's initial return to department patient with increased respiratory rate. No intercostal retractions. Patient pulse ox reading 89-90% room air, 3L N/c O2 applied pulse ox reading 97-99%. Awake, answering direct questions appropriate responses. Lung sounds with scattered rhonchi, few wheezes.  Cough moist but non-productive. Rhonchi decreased after patient encourage to cough. Bilateral lower extremities with no edema. ASSESSMENT/PLAN:     Patient received IV fluids at infusion center today (as ordered by Dr. Sulaiman Shipley). Returned to Radiation Oncology Department for assessment of increased respiratory rate and decreased pulse oximetry recording reported 87-90% post IV fluid infusion. Vital signs stable. Patient denies shortness of breath, breathing at ease. Instructed to go to ER if develops recurrent SOB, fevers, chills or AMS (lethargy). Son Maryann Pineda verbalized understanding. All questions answered. Treatment will continue as planned.        Coral Tong, MSN, APRN-CNP  Certified Nurse Practitioner for [address] [place] [address]   Phone: [phone]/ Fax: [phone]

## 2019-05-16 NOTE — PROGRESS NOTES
Patient awake and alert. Visitor present. 1 liter NSS hydration completed via port. Patient denies any complaints. T 97.6, /70, -122, resp 40-50, SaO2 87-90% on room air. Per visitor, this is \"normal \" for her. Patient denies any pain or SOB, just c/o of feeling \"very tired\". Moist cough noted(nonproductive). Breath sounds diminished throughout. Michel Wilson notified and spoke with Pino Hammond in rad onc. Patient escorted to Rad onc via wheelchair per Ángela's instructions. Nurse to nurse to nurse report given to Servando upon arrival to [employer]. Patient's port was flushed with NSS and Heparin but left acessed upon futher evaluation. Servando states she will de access port.

## 2019-05-17 ENCOUNTER — APPOINTMENT (OUTPATIENT)
Dept: RADIATION ONCOLOGY | Age: 74
End: 2019-05-17
Attending: RADIOLOGY
Payer: COMMERCIAL

## 2019-05-20 ENCOUNTER — APPOINTMENT (OUTPATIENT)
Dept: RADIATION ONCOLOGY | Age: 74
End: 2019-05-20
Attending: RADIOLOGY
Payer: COMMERCIAL

## 2019-05-21 ENCOUNTER — APPOINTMENT (OUTPATIENT)
Dept: RADIATION ONCOLOGY | Age: 74
End: 2019-05-21
Attending: RADIOLOGY
Payer: COMMERCIAL

## 2019-05-22 ENCOUNTER — APPOINTMENT (OUTPATIENT)
Dept: RADIATION ONCOLOGY | Age: 74
End: 2019-05-22
Attending: RADIOLOGY
Payer: COMMERCIAL

## 2019-05-23 ENCOUNTER — APPOINTMENT (OUTPATIENT)
Dept: RADIATION ONCOLOGY | Age: 74
End: 2019-05-23
Attending: RADIOLOGY
Payer: COMMERCIAL

## 2019-05-24 ENCOUNTER — APPOINTMENT (OUTPATIENT)
Dept: RADIATION ONCOLOGY | Age: 74
End: 2019-05-24
Attending: RADIOLOGY
Payer: COMMERCIAL

## 2019-05-28 ENCOUNTER — APPOINTMENT (OUTPATIENT)
Dept: RADIATION ONCOLOGY | Age: 74
End: 2019-05-28
Attending: RADIOLOGY
Payer: COMMERCIAL

## 2019-05-29 ENCOUNTER — APPOINTMENT (OUTPATIENT)
Dept: RADIATION ONCOLOGY | Age: 74
End: 2019-05-29
Attending: RADIOLOGY
Payer: COMMERCIAL

## 2019-09-30 NOTE — PROGRESS NOTES
1)            Radiation Oncology   Radiation Therapy Treatment Summary   Heidi Tineo MD MS Saman Valdes  1945                         74 y.o. Brief History:      -locally advanced lung CA      Amanda De has completed radiation treatment to the lung and mdstnm with 6x photons using a VMAT technique. The patient was prescribed a dose of 6000 cGy in 30 fractions ( +/ - / including boost pending clinical characteristics and applicable NCCN guidelines; total dose recorded per [facility] record). If indicated; CBCT daily image guidance / motion management (IBNLT 4D planning) / ABC / surface guidance +/- DIBH - applied PRN per SAHARA and RTOG standards. Treatment Start Date:  7/6/19  Treatment Completion Date:  8/15/19    The treatments were tolerated, without significant interruption. RTOG Acute Toxicity Grade [ARMSC]: 2. (esophagitis)    We will look forward seeing the patient back  for follow-up with myself or our NP [scheduled and PRN toxicity checks in addition]. The patient knows to call with any questions or concerns. Please feel free to contact me at either office to discuss the care of this patient, or if I can be of any further assistance. Heidi Lorenzo. Tere Tineo MD [address] [place] [address] Physicians  Radiation Oncology     c:  [phone]  [email or web address]  -[facility] ([address] [place] [address]):  o: [phone]  f:  [phone]  [facility]):  o: [phone]  f:  [phone]  -[facility]):  o: [phone]  f:  [phone]    NOTE: This report was transcribed using voice recognition software. Every effort was made to ensure accuracy; however, inadvertent computerized transcription errors may be present. 2)            Radiation Oncology   Radiation Therapy Treatment Summary   Heidi Tineo MD MS Anne Marie Valdes  1945                         74 y.o.                   Brief History:    -met CA --- brain    Amanda De has

## 2022-06-02 NOTE — PROGRESS NOTES
Ativan ordered for patient. Patient receptive to MRI tomorrow morning with prescribed medication. Will pass onto morning shift. Split-Thickness Skin Graft Text: The defect edges were debeveled with a #15 scalpel blade.  Given the location of the defect, shape of the defect and the proximity to free margins a split thickness skin graft was deemed most appropriate.  Using a sterile surgical marker, the primary defect shape was transferred to the donor site. The split thickness graft was then harvested.  The skin graft was then placed in the primary defect and oriented appropriately.